# Patient Record
Sex: MALE | Race: WHITE | NOT HISPANIC OR LATINO | Employment: OTHER | URBAN - METROPOLITAN AREA
[De-identification: names, ages, dates, MRNs, and addresses within clinical notes are randomized per-mention and may not be internally consistent; named-entity substitution may affect disease eponyms.]

---

## 2022-04-06 ENCOUNTER — APPOINTMENT (OUTPATIENT)
Dept: RADIOLOGY | Facility: MEDICAL CENTER | Age: 72
End: 2022-04-06
Attending: EMERGENCY MEDICINE
Payer: COMMERCIAL

## 2022-04-06 ENCOUNTER — HOSPITAL ENCOUNTER (EMERGENCY)
Facility: MEDICAL CENTER | Age: 72
End: 2022-04-06
Attending: EMERGENCY MEDICINE
Payer: COMMERCIAL

## 2022-04-06 VITALS
TEMPERATURE: 97.8 F | HEART RATE: 77 BPM | WEIGHT: 200 LBS | SYSTOLIC BLOOD PRESSURE: 105 MMHG | DIASTOLIC BLOOD PRESSURE: 47 MMHG | RESPIRATION RATE: 18 BRPM | HEIGHT: 72 IN | BODY MASS INDEX: 27.09 KG/M2 | OXYGEN SATURATION: 98 %

## 2022-04-06 DIAGNOSIS — R55 SYNCOPE, UNSPECIFIED SYNCOPE TYPE: ICD-10-CM

## 2022-04-06 DIAGNOSIS — N18.9 CHRONIC KIDNEY DISEASE, UNSPECIFIED CKD STAGE: ICD-10-CM

## 2022-04-06 DIAGNOSIS — E86.0 DEHYDRATION: ICD-10-CM

## 2022-04-06 LAB
ALBUMIN SERPL BCP-MCNC: 3.4 G/DL (ref 3.2–4.9)
ALBUMIN/GLOB SERPL: 1 G/DL
ALP SERPL-CCNC: 85 U/L (ref 30–99)
ALT SERPL-CCNC: 16 U/L (ref 2–50)
ANION GAP SERPL CALC-SCNC: 10 MMOL/L (ref 7–16)
AST SERPL-CCNC: 15 U/L (ref 12–45)
BASOPHILS # BLD AUTO: 0.4 % (ref 0–1.8)
BASOPHILS # BLD: 0.05 K/UL (ref 0–0.12)
BILIRUB SERPL-MCNC: 0.4 MG/DL (ref 0.1–1.5)
BUN SERPL-MCNC: 22 MG/DL (ref 8–22)
CALCIUM SERPL-MCNC: 9.1 MG/DL (ref 8.4–10.2)
CHLORIDE SERPL-SCNC: 101 MMOL/L (ref 96–112)
CO2 SERPL-SCNC: 25 MMOL/L (ref 20–33)
CREAT SERPL-MCNC: 1.63 MG/DL (ref 0.5–1.4)
EKG IMPRESSION: NORMAL
EOSINOPHIL # BLD AUTO: 0.49 K/UL (ref 0–0.51)
EOSINOPHIL NFR BLD: 4 % (ref 0–6.9)
ERYTHROCYTE [DISTWIDTH] IN BLOOD BY AUTOMATED COUNT: 47.3 FL (ref 35.9–50)
GFR SERPLBLD CREATININE-BSD FMLA CKD-EPI: 44 ML/MIN/1.73 M 2
GLOBULIN SER CALC-MCNC: 3.4 G/DL (ref 1.9–3.5)
GLUCOSE SERPL-MCNC: 147 MG/DL (ref 65–99)
HCT VFR BLD AUTO: 36 % (ref 42–52)
HGB BLD-MCNC: 11.8 G/DL (ref 14–18)
IMM GRANULOCYTES # BLD AUTO: 0.07 K/UL (ref 0–0.11)
IMM GRANULOCYTES NFR BLD AUTO: 0.6 % (ref 0–0.9)
LACTATE BLD-SCNC: 1.7 MMOL/L (ref 0.5–2)
LACTATE BLD-SCNC: 2.2 MMOL/L (ref 0.5–2)
LYMPHOCYTES # BLD AUTO: 0.86 K/UL (ref 1–4.8)
LYMPHOCYTES NFR BLD: 7.1 % (ref 22–41)
MCH RBC QN AUTO: 31.9 PG (ref 27–33)
MCHC RBC AUTO-ENTMCNC: 32.8 G/DL (ref 33.7–35.3)
MCV RBC AUTO: 97.3 FL (ref 81.4–97.8)
MONOCYTES # BLD AUTO: 1.11 K/UL (ref 0–0.85)
MONOCYTES NFR BLD AUTO: 9.1 % (ref 0–13.4)
NEUTROPHILS # BLD AUTO: 9.61 K/UL (ref 1.82–7.42)
NEUTROPHILS NFR BLD: 78.8 % (ref 44–72)
NRBC # BLD AUTO: 0 K/UL
NRBC BLD-RTO: 0 /100 WBC
NT-PROBNP SERPL IA-MCNC: 168 PG/ML (ref 0–125)
PLATELET # BLD AUTO: 295 K/UL (ref 164–446)
PMV BLD AUTO: 10.4 FL (ref 9–12.9)
POTASSIUM SERPL-SCNC: 4.6 MMOL/L (ref 3.6–5.5)
PROT SERPL-MCNC: 6.8 G/DL (ref 6–8.2)
RBC # BLD AUTO: 3.7 M/UL (ref 4.7–6.1)
SODIUM SERPL-SCNC: 136 MMOL/L (ref 135–145)
TROPONIN T SERPL-MCNC: 32 NG/L (ref 6–19)
TROPONIN T SERPL-MCNC: 39 NG/L (ref 6–19)
WBC # BLD AUTO: 12.2 K/UL (ref 4.8–10.8)

## 2022-04-06 PROCEDURE — 85025 COMPLETE CBC W/AUTO DIFF WBC: CPT

## 2022-04-06 PROCEDURE — 93005 ELECTROCARDIOGRAM TRACING: CPT | Performed by: EMERGENCY MEDICINE

## 2022-04-06 PROCEDURE — 83880 ASSAY OF NATRIURETIC PEPTIDE: CPT

## 2022-04-06 PROCEDURE — 99285 EMERGENCY DEPT VISIT HI MDM: CPT

## 2022-04-06 PROCEDURE — 71045 X-RAY EXAM CHEST 1 VIEW: CPT

## 2022-04-06 PROCEDURE — 36415 COLL VENOUS BLD VENIPUNCTURE: CPT

## 2022-04-06 PROCEDURE — 84484 ASSAY OF TROPONIN QUANT: CPT

## 2022-04-06 PROCEDURE — 83605 ASSAY OF LACTIC ACID: CPT

## 2022-04-06 PROCEDURE — 700105 HCHG RX REV CODE 258: Performed by: EMERGENCY MEDICINE

## 2022-04-06 PROCEDURE — 80053 COMPREHEN METABOLIC PANEL: CPT

## 2022-04-06 RX ORDER — MONTELUKAST SODIUM 10 MG/1
10 TABLET ORAL EVERY EVENING
COMMUNITY

## 2022-04-06 RX ORDER — ALBUTEROL SULFATE 90 UG/1
1-2 AEROSOL, METERED RESPIRATORY (INHALATION) EVERY 4 HOURS PRN
COMMUNITY

## 2022-04-06 RX ORDER — SODIUM CHLORIDE 9 MG/ML
1000 INJECTION, SOLUTION INTRAVENOUS ONCE
Status: COMPLETED | OUTPATIENT
Start: 2022-04-06 | End: 2022-04-06

## 2022-04-06 RX ORDER — ACETAMINOPHEN 500 MG
500 TABLET ORAL EVERY 6 HOURS PRN
Status: SHIPPED | COMMUNITY
End: 2023-06-20

## 2022-04-06 RX ORDER — DEXAMETHASONE 4 MG/1
6 TABLET ORAL DAILY
Status: SHIPPED | COMMUNITY
End: 2023-06-20

## 2022-04-06 RX ORDER — FINASTERIDE 5 MG/1
5 TABLET, FILM COATED ORAL EVERY EVENING
COMMUNITY

## 2022-04-06 RX ORDER — BENZONATATE 100 MG/1
200 CAPSULE ORAL 3 TIMES DAILY PRN
COMMUNITY

## 2022-04-06 RX ORDER — GUAIFENESIN 200 MG/1
400 TABLET ORAL 3 TIMES DAILY
COMMUNITY

## 2022-04-06 RX ORDER — METFORMIN HYDROCHLORIDE 500 MG/1
2000 TABLET, EXTENDED RELEASE ORAL EVERY EVENING
Status: SHIPPED | COMMUNITY
End: 2023-06-20

## 2022-04-06 RX ORDER — LIDOCAINE 50 MG/G
1 PATCH TOPICAL 2 TIMES DAILY PRN
COMMUNITY

## 2022-04-06 RX ORDER — ALLOPURINOL 100 MG/1
200 TABLET ORAL EVERY EVENING
COMMUNITY

## 2022-04-06 RX ORDER — TAMSULOSIN HYDROCHLORIDE 0.4 MG/1
0.4 CAPSULE ORAL DAILY
COMMUNITY

## 2022-04-06 RX ORDER — NAPROXEN SODIUM 220 MG
220 TABLET ORAL 2 TIMES DAILY PRN
Status: SHIPPED | COMMUNITY
End: 2022-04-14

## 2022-04-06 RX ADMIN — SODIUM CHLORIDE 1000 ML: 9 INJECTION, SOLUTION INTRAVENOUS at 13:42

## 2022-04-06 ASSESSMENT — ENCOUNTER SYMPTOMS
EYE REDNESS: 0
FALLS: 1
CHILLS: 0
LOSS OF CONSCIOUSNESS: 1
DIZZINESS: 1
FOCAL WEAKNESS: 0
COUGH: 0
HEADACHES: 0
SHORTNESS OF BREATH: 0
BACK PAIN: 0
SEIZURES: 0
NECK PAIN: 0
FEVER: 0
ABDOMINAL PAIN: 0
VOMITING: 0
BLURRED VISION: 0
SORE THROAT: 0

## 2022-04-06 NOTE — ED NOTES
Pt AO4, sitting up in bed. Denies any pain or SOB at this time. Maintaining SpO2>90% on 4L NC.   Labs collected by straight stick and sent to lab.   Safety precautions in place including gurney locked in lowest position, both side rails up, call light within reach. Pt educated to use call light if requiring any assistance with getting oob.

## 2022-04-06 NOTE — ED NOTES
Med rec updated and complete, per pt   Allergies reviewed, per pt   Interviewed pt with wife at bedside with permission from pt.  Pt had a list of medications on his phone.

## 2022-04-06 NOTE — DISCHARGE INSTRUCTIONS
You were seen in the Emergency Department for syncope.    EKG, labs, chest xray were completed without significant acute abnormalities other than mild elevation of kidney function and dehydration.  X-ray shows continued signs of recent Covid infection.    Please use 1,000mg of tylenol every 6 hours as needed for pain.  Take home medication as directed.  Make sure you are drinking plenty of fluids    Please follow up with your primary care physician.    Return to the Emergency Department with recurrent syncope, chest pain, trouble breathing, or other concerns.

## 2022-04-06 NOTE — ED PROVIDER NOTES
"Patient time ED Provider Note    CHIEF COMPLAINT  Chief Complaint   Patient presents with   • Syncope     Pt walking out of restroom at [employer] station and \"I felt a little off and everything went black\" No LOC, did not hit head, no anticoags.        HPI  Juan José Pate is a 72 y.o. male with history of COPD, diabetes, hypertension with recent long hospitalization for Covid who presents to the emergency department following a syncopal episode.  The patient states he was feeling well this morning.  He went into a gas station bathroom and did sit down to have a bowel movement.  He got up washed his hands and was walking out of the bathroom with his walker when he started to feel dizzy and everything went black.  His wife was there and witnessed this.  The patient slowly lowered himself down and unclear if he fully lost consciousness.  He did not hit his head or injure himself.  Patient denies any associated headache, chest pain, increasing shortness of breath from his baseline, leg swelling.  He states he feels fine now.  The patient was on blood pressure medication however this was stopped while he was in the hospital with Covid as he did have some lower blood pressures.  He did have a protein shake this morning however wife does state that he does not drink a large amount of fluids.  He is currently on a baseline of 2 to 3 L of oxygen since his recent hospitalization.    REVIEW OF SYSTEMS  See HPI for further details.   Review of Systems   Constitutional: Negative for chills and fever.   HENT: Negative for sore throat.    Eyes: Negative for blurred vision and redness.   Respiratory: Negative for cough and shortness of breath.    Cardiovascular: Negative for chest pain and leg swelling.   Gastrointestinal: Negative for abdominal pain and vomiting.   Genitourinary: Negative for dysuria and urgency.   Musculoskeletal: Positive for falls. Negative for back pain and neck pain.   Skin: Negative for rash.   Neurological: " Positive for dizziness and loss of consciousness. Negative for focal weakness, seizures and headaches.   Psychiatric/Behavioral: Negative for suicidal ideas.         PAST MEDICAL HISTORY   has a past medical history of Asthma, Chronic obstructive pulmonary disease (HCC), Diabetes (HCC), and Hypertension.    SOCIAL HISTORY  Social History     Tobacco Use   • Smoking status: Former Smoker     Years: 6.00     Quit date:      Years since quittin.2   • Smokeless tobacco: Never Used   Vaping Use   • Vaping Use: Never used   Substance and Sexual Activity   • Alcohol use: Never   • Drug use: Never   • Sexual activity: Not on file       SURGICAL HISTORY  patient denies any surgical history    CURRENT MEDICATIONS  Home Medications     Reviewed by Leia Martinez (Pharmacy Tech) on 22 at 1251  Med List Status: Complete   Medication Last Dose Status   acetaminophen (TYLENOL) 500 MG Tab 2022 Active   albuterol 108 (90 Base) MCG/ACT Aero Soln inhalation aerosol 2022 Active   allopurinol (ZYLOPRIM) 100 MG Tab 2022 Active   benzonatate (TESSALON) 100 MG Cap 2022 Active   dexamethasone (DECADRON) 4 MG Tab 4/3/2022 Active   diclofenac sodium (VOLTAREN) 1 % Gel > 2 days Active   finasteride (PROSCAR) 5 MG Tab 2022 Active   fluticasone-salmeterol (ADVAIR) 100-50 MCG/DOSE AEROSOL POWDER, BREATH ACTIVATED 2022 Active   guaifenesin 200 MG tablet 2022 Active   ipratropium (ATROVENT) 0.02 % Solution 2022 Active   lidocaine (LIDODERM) 5 % Patch 2022 Active   metFORMIN ER (GLUCOPHAGE XR) 500 MG TABLET SR 24 HR 2022 Active   montelukast (SINGULAIR) 10 MG Tab 2022 Active   naproxen (ALEVE) 220 MG tablet 2022 Active   tamsulosin (FLOMAX) 0.4 MG capsule 2022 Active                ALLERGIES  Allergies   Allergen Reactions   • Oxycodone Vomiting and Nausea   • Sulfa Drugs Rash     .       PHYSICAL EXAM   VITAL SIGNS: /47   Pulse 77   Temp 36.6 °C (97.8 °F) (Temporal)    Resp 18   Ht 1.829 m (6')   Wt 90.7 kg (200 lb)   SpO2 98%   BMI 27.12 kg/m²      Physical Exam  Constitutional:       General: He is not in acute distress.     Comments: Chronically ill, nontoxic appearing   HENT:      Head: Normocephalic and atraumatic.   Eyes:      Conjunctiva/sclera: Conjunctivae normal.      Pupils: Pupils are equal, round, and reactive to light.   Cardiovascular:      Rate and Rhythm: Normal rate and regular rhythm.      Heart sounds: Normal heart sounds.   Pulmonary:      Effort: Pulmonary effort is normal. No respiratory distress.      Breath sounds: Normal breath sounds.   Abdominal:      General: There is no distension.      Palpations: Abdomen is soft.      Tenderness: There is no abdominal tenderness.   Musculoskeletal:         General: No tenderness. Normal range of motion.      Cervical back: Normal range of motion and neck supple.   Skin:     General: Skin is warm and dry.   Neurological:      Mental Status: He is alert and oriented to person, place, and time.      Comments: Moving all extremities spontaneously   Psychiatric:         Mood and Affect: Affect normal.           DIAGNOSTIC STUDIES    EKG  Results for orders placed or performed during the hospital encounter of 22   EKG (NOW)   Result Value Ref Range    Report       [facility] Emergency [facility].    Test Date:  2022  Pt Name:    PHYLICIA DOTY                 Department: [facility]  MRN:        [id number]                      Room:       -ROOM 9  Gender:     Male                         Technician: [id number]  :        [id number]                   Requested By:MISSY WALKER  Order #:    [id number]                    Reading MD: Missy Walker MD    Measurements  Intervals                                Axis  Rate:       87                           P:          23  TX:         174                          QRS:        -8  QRSD:       84                           T:          6  QT:          371  QTc:        447    Interpretive Statements  Sinus rhythm  Ventricular trigeminy  Probable left atrial enlargement  Left ventricular hypertrophy  No ST or T wave change  No previous ECG available for comparison  Electronically Signed On 4-6-2022 14:45:55 PDT by Missy Holt MD             LABS  Personally reviewed by me  Labs Reviewed   CBC WITH DIFFERENTIAL - Abnormal; Notable for the following components:       Result Value    WBC 12.2 (*)     RBC 3.70 (*)     Hemoglobin 11.8 (*)     Hematocrit 36.0 (*)     MCHC 32.8 (*)     Neutrophils-Polys 78.80 (*)     Lymphocytes 7.10 (*)     Neutrophils (Absolute) 9.61 (*)     Lymphs (Absolute) 0.86 (*)     Monos (Absolute) 1.11 (*)     All other components within normal limits   COMP METABOLIC PANEL - Abnormal; Notable for the following components:    Glucose 147 (*)     Creatinine 1.63 (*)     All other components within normal limits   TROPONIN - Abnormal; Notable for the following components:    Troponin T 32 (*)     All other components within normal limits   LACTIC ACID - Abnormal; Notable for the following components:    Lactic Acid 2.2 (*)     All other components within normal limits   PROBRAIN NATRIURETIC PEPTIDE, NT - Abnormal; Notable for the following components:    NT-proBNP 168 (*)     All other components within normal limits   ESTIMATED GFR - Abnormal; Notable for the following components:    GFR (CKD-EPI) 44 (*)     All other components within normal limits   TROPONIN - Abnormal; Notable for the following components:    Troponin T 39 (*)     All other components within normal limits   LACTIC ACID           RADIOLOGY  Personally reviewed by me  DX-CHEST-PORTABLE (1 VIEW)   Final Result      1.  Use bilateral patchy ill-defined airspace and interstitial infiltrates, right greater than left. Differential diagnosis includes atypical infection such as Covid pneumonia, chronic interstitial lung disease and atypical interstitial edema.      2.  Elevation of  the right hemidiaphragm.                ED COURSE  Vitals:    04/06/22 1546 04/06/22 1615 04/06/22 1645 04/06/22 1647   BP:  106/68 105/47    Pulse:  88 77    Resp:       Temp:       TempSrc:       SpO2: 97% 95%  98%   Weight:       Height:             Medications administered:  Medications   NS infusion 1,000 mL (0 mL Intravenous Stopped 4/6/22 1432)         Old records personally reviewed:  Unable to review [facility] or prior labs.        MEDICAL DECISION MAKING  Patient with history HTN, diabetes, COPD and recent prolonged admission for Covid who presents following a syncopal episode today.  His blood pressure was slightly low on arrival however patient has otherwise normal vital signs.  He has no hypoxia on his baseline oxygen requirement or signs of respiratory distress.  Patient does not have any symptoms concerning for ACS, pulmonary embolism, or subarachnoid hemorrhage.  His EKG does not show ischemia or arrhythmia.  His frequent PVCs however.  Labs show mild elevation of lactate and mild nonspecific leukocytosis.  He has no infectious symptoms therefore I think sepsis is unlikely.  Chest x-ray does show persistent findings from recent Covid infection, doubt new pneumonia. He does have a history of poor oral intake and may be mildly dehydrated.  His creatinine is elevated at 1.6.  The patient reports he does have a history of some underlying kidney disease although he does not know his baseline creatinine.  Troponin is minimally elevated however patient is not having any chest pain and is not significantly increasing on repeat therefore ACS unlikely.    Upon reassessment, patient is resting comfortably with normal vital signs.  No new complaints at this time. He states he feels well and he was able to ambulate throughout the department without dizziness.  I suspect that this was likely due to episode of low blood pressure/vagal response plus some mild dehydration.  Discussed results with patient  and/or family as well as importance of primary care follow up for recheck of kidney function in the next week.  Patient understands plan of care and strict return precautions for new or changing symptoms.         IMPRESSION  (R55) Syncope, unspecified syncope type  (N18.9) Chronic kidney disease, unspecified CKD stage  (E86.0) Dehydration    Disposition: Discharge home, stable condition  Results, diagnoses, and treatment options were discussed with the patient and/or family. Patient verbalized understanding of plan of care.    Patient referred to primary care provider for monitoring and treatment of blood pressure.      Discharge Medication List as of 4/6/2022  4:49 PM              Electronically signed by: Missy Holt M.D., 4/6/2022 1:06 PM

## 2022-04-06 NOTE — ED NOTES
"NS bolus completed. BP improved, SR w/ PVCs on monitor.   Pt reports \"I feel good, just tired\" denies any pain or SOB at this time.  "

## 2022-04-06 NOTE — ED TRIAGE NOTES
"Chief Complaint   Patient presents with   • Syncope     Pt walking out of restroom at gas station and \"I felt a little off and everything went black\" No LOC, did not hit head, no anticoags.      BP (!) 95/64   Pulse 95   Temp 36.4 °C (97.5 °F) (Temporal)   Resp (!) 28   Ht 1.829 m (6')   Wt 90.7 kg (200 lb)   SpO2 98%   BMI 27.12 kg/m²     Pt bib ems, PIV established. Per EMS: GCS 15, . Pt wears 2-4 L home oxygen and sating mid 80s, placed on 6L NC. 12 lead= sinus tach with PVCs.   [facility] day [facility] for covid pna.   "

## 2022-04-14 ENCOUNTER — HOSPITAL ENCOUNTER (INPATIENT)
Facility: MEDICAL CENTER | Age: 72
LOS: 6 days | DRG: 871 | End: 2022-04-20
Attending: EMERGENCY MEDICINE | Admitting: HOSPITALIST
Payer: COMMERCIAL

## 2022-04-14 ENCOUNTER — APPOINTMENT (OUTPATIENT)
Dept: RADIOLOGY | Facility: MEDICAL CENTER | Age: 72
DRG: 871 | End: 2022-04-14
Attending: EMERGENCY MEDICINE
Payer: COMMERCIAL

## 2022-04-14 DIAGNOSIS — R91.8 PULMONARY INFILTRATES: ICD-10-CM

## 2022-04-14 DIAGNOSIS — R11.0 NAUSEA: ICD-10-CM

## 2022-04-14 DIAGNOSIS — R09.02 HYPOXIA: ICD-10-CM

## 2022-04-14 DIAGNOSIS — A41.9 SEPSIS WITHOUT ACUTE ORGAN DYSFUNCTION, DUE TO UNSPECIFIED ORGANISM (HCC): ICD-10-CM

## 2022-04-14 DIAGNOSIS — Z79.899 DVT PROPHYLAXIS: ICD-10-CM

## 2022-04-14 DIAGNOSIS — U07.1 COVID-19: ICD-10-CM

## 2022-04-14 DIAGNOSIS — J12.82 PNEUMONIA DUE TO COVID-19 VIRUS: ICD-10-CM

## 2022-04-14 DIAGNOSIS — K59.00 CONSTIPATION, UNSPECIFIED CONSTIPATION TYPE: ICD-10-CM

## 2022-04-14 DIAGNOSIS — U07.1 PNEUMONIA DUE TO COVID-19 VIRUS: ICD-10-CM

## 2022-04-14 DIAGNOSIS — R73.9 HYPERGLYCEMIA: ICD-10-CM

## 2022-04-14 DIAGNOSIS — R55 SYNCOPE, UNSPECIFIED SYNCOPE TYPE: ICD-10-CM

## 2022-04-14 DIAGNOSIS — R55 SYNCOPE AND COLLAPSE: ICD-10-CM

## 2022-04-14 PROBLEM — N18.31 STAGE 3A CHRONIC KIDNEY DISEASE: Status: ACTIVE | Noted: 2022-04-14

## 2022-04-14 PROBLEM — K21.9 GASTROESOPHAGEAL REFLUX DISEASE WITHOUT ESOPHAGITIS: Status: ACTIVE | Noted: 2022-04-14

## 2022-04-14 PROBLEM — R19.7 DIARRHEA: Status: ACTIVE | Noted: 2022-04-14

## 2022-04-14 PROBLEM — J18.9 PNEUMONIA: Status: ACTIVE | Noted: 2022-04-14

## 2022-04-14 PROBLEM — J96.21 ACUTE ON CHRONIC RESPIRATORY FAILURE WITH HYPOXIA (HCC): Status: ACTIVE | Noted: 2022-04-14

## 2022-04-14 LAB
ALBUMIN SERPL BCP-MCNC: 3.6 G/DL (ref 3.2–4.9)
ALBUMIN/GLOB SERPL: 1 G/DL
ALP SERPL-CCNC: 95 U/L (ref 30–99)
ALT SERPL-CCNC: 22 U/L (ref 2–50)
ANION GAP SERPL CALC-SCNC: 10 MMOL/L (ref 7–16)
APPEARANCE UR: CLEAR
AST SERPL-CCNC: 19 U/L (ref 12–45)
BASOPHILS # BLD AUTO: 0.6 % (ref 0–1.8)
BASOPHILS # BLD: 0.06 K/UL (ref 0–0.12)
BILIRUB SERPL-MCNC: 0.4 MG/DL (ref 0.1–1.5)
BILIRUB UR QL STRIP.AUTO: NEGATIVE
BUN SERPL-MCNC: 21 MG/DL (ref 8–22)
CALCIUM SERPL-MCNC: 9.4 MG/DL (ref 8.4–10.2)
CHLORIDE SERPL-SCNC: 102 MMOL/L (ref 96–112)
CO2 SERPL-SCNC: 26 MMOL/L (ref 20–33)
COLOR UR: YELLOW
CREAT SERPL-MCNC: 1.32 MG/DL (ref 0.5–1.4)
EKG IMPRESSION: NORMAL
EOSINOPHIL # BLD AUTO: 0.36 K/UL (ref 0–0.51)
EOSINOPHIL NFR BLD: 3.6 % (ref 0–6.9)
ERYTHROCYTE [DISTWIDTH] IN BLOOD BY AUTOMATED COUNT: 47.8 FL (ref 35.9–50)
FLUAV RNA SPEC QL NAA+PROBE: NEGATIVE
FLUBV RNA SPEC QL NAA+PROBE: NEGATIVE
GFR SERPLBLD CREATININE-BSD FMLA CKD-EPI: 57 ML/MIN/1.73 M 2
GLOBULIN SER CALC-MCNC: 3.7 G/DL (ref 1.9–3.5)
GLUCOSE BLD STRIP.AUTO-MCNC: 140 MG/DL (ref 65–99)
GLUCOSE SERPL-MCNC: 128 MG/DL (ref 65–99)
GLUCOSE UR STRIP.AUTO-MCNC: 500 MG/DL
HCT VFR BLD AUTO: 41.7 % (ref 42–52)
HGB BLD-MCNC: 13.6 G/DL (ref 14–18)
IMM GRANULOCYTES # BLD AUTO: 0.07 K/UL (ref 0–0.11)
IMM GRANULOCYTES NFR BLD AUTO: 0.7 % (ref 0–0.9)
KETONES UR STRIP.AUTO-MCNC: NEGATIVE MG/DL
LACTATE BLD-SCNC: 1.2 MMOL/L (ref 0.5–2)
LEUKOCYTE ESTERASE UR QL STRIP.AUTO: NEGATIVE
LYMPHOCYTES # BLD AUTO: 0.73 K/UL (ref 1–4.8)
LYMPHOCYTES NFR BLD: 7.3 % (ref 22–41)
MCH RBC QN AUTO: 31.6 PG (ref 27–33)
MCHC RBC AUTO-ENTMCNC: 32.6 G/DL (ref 33.7–35.3)
MCV RBC AUTO: 97 FL (ref 81.4–97.8)
MICRO URNS: ABNORMAL
MONOCYTES # BLD AUTO: 0.7 K/UL (ref 0–0.85)
MONOCYTES NFR BLD AUTO: 7 % (ref 0–13.4)
NEUTROPHILS # BLD AUTO: 8.02 K/UL (ref 1.82–7.42)
NEUTROPHILS NFR BLD: 80.8 % (ref 44–72)
NITRITE UR QL STRIP.AUTO: NEGATIVE
NRBC # BLD AUTO: 0 K/UL
NRBC BLD-RTO: 0 /100 WBC
NT-PROBNP SERPL IA-MCNC: 109 PG/ML (ref 0–125)
PH UR STRIP.AUTO: 5.5 [PH] (ref 5–8)
PLATELET # BLD AUTO: 206 K/UL (ref 164–446)
PMV BLD AUTO: 10 FL (ref 9–12.9)
POTASSIUM SERPL-SCNC: 4.7 MMOL/L (ref 3.6–5.5)
PROCALCITONIN SERPL-MCNC: 0.14 NG/ML
PROT SERPL-MCNC: 7.3 G/DL (ref 6–8.2)
PROT UR QL STRIP: NEGATIVE MG/DL
RBC # BLD AUTO: 4.3 M/UL (ref 4.7–6.1)
RBC UR QL AUTO: NEGATIVE
RSV RNA SPEC QL NAA+PROBE: NEGATIVE
SARS-COV-2 RNA RESP QL NAA+PROBE: DETECTED
SODIUM SERPL-SCNC: 138 MMOL/L (ref 135–145)
SP GR UR STRIP.AUTO: 1.02
SPECIMEN SOURCE: ABNORMAL
TROPONIN T SERPL-MCNC: 16 NG/L (ref 6–19)
WBC # BLD AUTO: 9.9 K/UL (ref 4.8–10.8)

## 2022-04-14 PROCEDURE — 700102 HCHG RX REV CODE 250 W/ 637 OVERRIDE(OP): Performed by: EMERGENCY MEDICINE

## 2022-04-14 PROCEDURE — 71045 X-RAY EXAM CHEST 1 VIEW: CPT

## 2022-04-14 PROCEDURE — 71275 CT ANGIOGRAPHY CHEST: CPT

## 2022-04-14 PROCEDURE — 83880 ASSAY OF NATRIURETIC PEPTIDE: CPT

## 2022-04-14 PROCEDURE — 0241U HCHG SARS-COV-2 COVID-19 NFCT DS RESP RNA 4 TRGT MIC: CPT

## 2022-04-14 PROCEDURE — 99285 EMERGENCY DEPT VISIT HI MDM: CPT

## 2022-04-14 PROCEDURE — 83605 ASSAY OF LACTIC ACID: CPT

## 2022-04-14 PROCEDURE — 81003 URINALYSIS AUTO W/O SCOPE: CPT

## 2022-04-14 PROCEDURE — 700102 HCHG RX REV CODE 250 W/ 637 OVERRIDE(OP): Performed by: HOSPITALIST

## 2022-04-14 PROCEDURE — 700117 HCHG RX CONTRAST REV CODE 255: Performed by: EMERGENCY MEDICINE

## 2022-04-14 PROCEDURE — 84484 ASSAY OF TROPONIN QUANT: CPT

## 2022-04-14 PROCEDURE — 87086 URINE CULTURE/COLONY COUNT: CPT

## 2022-04-14 PROCEDURE — 87040 BLOOD CULTURE FOR BACTERIA: CPT

## 2022-04-14 PROCEDURE — 770020 HCHG ROOM/CARE - TELE (206)

## 2022-04-14 PROCEDURE — 36415 COLL VENOUS BLD VENIPUNCTURE: CPT

## 2022-04-14 PROCEDURE — 94640 AIRWAY INHALATION TREATMENT: CPT

## 2022-04-14 PROCEDURE — 93005 ELECTROCARDIOGRAM TRACING: CPT | Performed by: EMERGENCY MEDICINE

## 2022-04-14 PROCEDURE — 84145 PROCALCITONIN (PCT): CPT

## 2022-04-14 PROCEDURE — 80053 COMPREHEN METABOLIC PANEL: CPT

## 2022-04-14 PROCEDURE — 99223 1ST HOSP IP/OBS HIGH 75: CPT | Mod: AI | Performed by: HOSPITALIST

## 2022-04-14 PROCEDURE — C9803 HOPD COVID-19 SPEC COLLECT: HCPCS | Performed by: EMERGENCY MEDICINE

## 2022-04-14 PROCEDURE — 87641 MR-STAPH DNA AMP PROBE: CPT

## 2022-04-14 PROCEDURE — 85025 COMPLETE CBC W/AUTO DIFF WBC: CPT

## 2022-04-14 PROCEDURE — 82962 GLUCOSE BLOOD TEST: CPT

## 2022-04-14 PROCEDURE — A9270 NON-COVERED ITEM OR SERVICE: HCPCS | Performed by: HOSPITALIST

## 2022-04-14 PROCEDURE — A9270 NON-COVERED ITEM OR SERVICE: HCPCS | Performed by: EMERGENCY MEDICINE

## 2022-04-14 RX ORDER — AMOXICILLIN AND CLAVULANATE POTASSIUM 875; 125 MG/1; MG/1
1 TABLET, FILM COATED ORAL 2 TIMES DAILY
Status: ON HOLD | COMMUNITY
Start: 2022-04-13 | End: 2022-04-19

## 2022-04-14 RX ORDER — AMOXICILLIN 250 MG
2 CAPSULE ORAL 2 TIMES DAILY
Status: DISCONTINUED | OUTPATIENT
Start: 2022-04-14 | End: 2022-04-14

## 2022-04-14 RX ORDER — PANTOPRAZOLE SODIUM 40 MG/1
40 TABLET, DELAYED RELEASE ORAL DAILY
COMMUNITY

## 2022-04-14 RX ORDER — SODIUM CHLORIDE, SODIUM LACTATE, POTASSIUM CHLORIDE, AND CALCIUM CHLORIDE .6; .31; .03; .02 G/100ML; G/100ML; G/100ML; G/100ML
500 INJECTION, SOLUTION INTRAVENOUS
Status: DISCONTINUED | OUTPATIENT
Start: 2022-04-14 | End: 2022-04-20 | Stop reason: HOSPADM

## 2022-04-14 RX ORDER — TRAMADOL HYDROCHLORIDE 50 MG/1
50 TABLET ORAL EVERY 6 HOURS PRN
Status: DISCONTINUED | OUTPATIENT
Start: 2022-04-14 | End: 2022-04-20 | Stop reason: HOSPADM

## 2022-04-14 RX ORDER — BUPROPION HYDROCHLORIDE 150 MG/1
150 TABLET, EXTENDED RELEASE ORAL 2 TIMES DAILY
Status: DISCONTINUED | OUTPATIENT
Start: 2022-04-14 | End: 2022-04-20 | Stop reason: HOSPADM

## 2022-04-14 RX ORDER — SODIUM CHLORIDE, SODIUM LACTATE, POTASSIUM CHLORIDE, AND CALCIUM CHLORIDE .6; .31; .03; .02 G/100ML; G/100ML; G/100ML; G/100ML
30 INJECTION, SOLUTION INTRAVENOUS
Status: DISCONTINUED | OUTPATIENT
Start: 2022-04-14 | End: 2022-04-20 | Stop reason: HOSPADM

## 2022-04-14 RX ORDER — MONTELUKAST SODIUM 10 MG/1
10 TABLET ORAL EVERY EVENING
Status: DISCONTINUED | OUTPATIENT
Start: 2022-04-14 | End: 2022-04-20 | Stop reason: HOSPADM

## 2022-04-14 RX ORDER — HYDROMORPHONE HYDROCHLORIDE 1 MG/ML
0.25 INJECTION, SOLUTION INTRAMUSCULAR; INTRAVENOUS; SUBCUTANEOUS
Status: DISCONTINUED | OUTPATIENT
Start: 2022-04-14 | End: 2022-04-14

## 2022-04-14 RX ORDER — ONDANSETRON 4 MG/1
4 TABLET, ORALLY DISINTEGRATING ORAL EVERY 4 HOURS PRN
Status: DISCONTINUED | OUTPATIENT
Start: 2022-04-14 | End: 2022-04-20 | Stop reason: HOSPADM

## 2022-04-14 RX ORDER — HYDROXYZINE HYDROCHLORIDE 25 MG/1
25 TABLET, FILM COATED ORAL
COMMUNITY
End: 2023-06-20

## 2022-04-14 RX ORDER — FINASTERIDE 5 MG/1
5 TABLET, FILM COATED ORAL EVERY EVENING
Status: DISCONTINUED | OUTPATIENT
Start: 2022-04-14 | End: 2022-04-20 | Stop reason: HOSPADM

## 2022-04-14 RX ORDER — ALBUTEROL SULFATE 90 UG/1
1-2 AEROSOL, METERED RESPIRATORY (INHALATION) EVERY 4 HOURS PRN
Status: DISCONTINUED | OUTPATIENT
Start: 2022-04-14 | End: 2022-04-20 | Stop reason: HOSPADM

## 2022-04-14 RX ORDER — EMPAGLIFLOZIN 25 MG/1
12.5 TABLET, FILM COATED ORAL DAILY
COMMUNITY

## 2022-04-14 RX ORDER — BUPROPION HYDROCHLORIDE 150 MG/1
150 TABLET, EXTENDED RELEASE ORAL 2 TIMES DAILY
COMMUNITY

## 2022-04-14 RX ORDER — ALLOPURINOL 100 MG/1
200 TABLET ORAL EVERY EVENING
Status: DISCONTINUED | OUTPATIENT
Start: 2022-04-14 | End: 2022-04-20 | Stop reason: HOSPADM

## 2022-04-14 RX ORDER — OXYCODONE HYDROCHLORIDE 5 MG/1
2.5 TABLET ORAL
Status: DISCONTINUED | OUTPATIENT
Start: 2022-04-14 | End: 2022-04-14

## 2022-04-14 RX ORDER — LINEZOLID 2 MG/ML
600 INJECTION, SOLUTION INTRAVENOUS EVERY 12 HOURS
Status: DISCONTINUED | OUTPATIENT
Start: 2022-04-14 | End: 2022-04-14

## 2022-04-14 RX ORDER — HYDROXYZINE HYDROCHLORIDE 25 MG/1
25 TABLET, FILM COATED ORAL
Status: DISCONTINUED | OUTPATIENT
Start: 2022-04-14 | End: 2022-04-20 | Stop reason: HOSPADM

## 2022-04-14 RX ORDER — ACETAMINOPHEN 500 MG
500 TABLET ORAL EVERY 6 HOURS PRN
Status: DISCONTINUED | OUTPATIENT
Start: 2022-04-14 | End: 2022-04-17

## 2022-04-14 RX ORDER — DEXAMETHASONE 4 MG/1
6 TABLET ORAL DAILY
Status: DISCONTINUED | OUTPATIENT
Start: 2022-04-15 | End: 2022-04-20 | Stop reason: HOSPADM

## 2022-04-14 RX ORDER — DEXAMETHASONE 4 MG/1
6 TABLET ORAL ONCE
Status: COMPLETED | OUTPATIENT
Start: 2022-04-14 | End: 2022-04-14

## 2022-04-14 RX ORDER — TAMSULOSIN HYDROCHLORIDE 0.4 MG/1
0.4 CAPSULE ORAL EVERY EVENING
Status: DISCONTINUED | OUTPATIENT
Start: 2022-04-14 | End: 2022-04-18

## 2022-04-14 RX ORDER — BISACODYL 10 MG
10 SUPPOSITORY, RECTAL RECTAL
Status: DISCONTINUED | OUTPATIENT
Start: 2022-04-14 | End: 2022-04-14

## 2022-04-14 RX ORDER — ONDANSETRON 2 MG/ML
4 INJECTION INTRAMUSCULAR; INTRAVENOUS EVERY 4 HOURS PRN
Status: DISCONTINUED | OUTPATIENT
Start: 2022-04-14 | End: 2022-04-20 | Stop reason: HOSPADM

## 2022-04-14 RX ORDER — OXYCODONE HYDROCHLORIDE 5 MG/1
5 TABLET ORAL
Status: DISCONTINUED | OUTPATIENT
Start: 2022-04-14 | End: 2022-04-14

## 2022-04-14 RX ORDER — BENZONATATE 100 MG/1
200 CAPSULE ORAL 3 TIMES DAILY PRN
Status: DISCONTINUED | OUTPATIENT
Start: 2022-04-14 | End: 2022-04-20 | Stop reason: HOSPADM

## 2022-04-14 RX ORDER — OMEPRAZOLE 20 MG/1
20 CAPSULE, DELAYED RELEASE ORAL DAILY
Status: DISCONTINUED | OUTPATIENT
Start: 2022-04-15 | End: 2022-04-20 | Stop reason: HOSPADM

## 2022-04-14 RX ORDER — POLYETHYLENE GLYCOL 3350 17 G/17G
1 POWDER, FOR SOLUTION ORAL
Status: DISCONTINUED | OUTPATIENT
Start: 2022-04-14 | End: 2022-04-14

## 2022-04-14 RX ORDER — BUDESONIDE AND FORMOTEROL FUMARATE DIHYDRATE 80; 4.5 UG/1; UG/1
2 AEROSOL RESPIRATORY (INHALATION)
Status: DISCONTINUED | OUTPATIENT
Start: 2022-04-14 | End: 2022-04-20 | Stop reason: HOSPADM

## 2022-04-14 RX ADMIN — RIVAROXABAN 10 MG: 10 TABLET, FILM COATED ORAL at 20:32

## 2022-04-14 RX ADMIN — TAMSULOSIN HYDROCHLORIDE 0.4 MG: 0.4 CAPSULE ORAL at 19:54

## 2022-04-14 RX ADMIN — ALLOPURINOL 200 MG: 100 TABLET ORAL at 19:53

## 2022-04-14 RX ADMIN — MONTELUKAST 10 MG: 10 TABLET, FILM COATED ORAL at 19:53

## 2022-04-14 RX ADMIN — TRAMADOL HYDROCHLORIDE 50 MG: 50 TABLET, COATED ORAL at 20:59

## 2022-04-14 RX ADMIN — BENZONATATE 200 MG: 100 CAPSULE ORAL at 20:33

## 2022-04-14 RX ADMIN — BUPROPION HYDROCHLORIDE 150 MG: 150 TABLET, EXTENDED RELEASE ORAL at 19:54

## 2022-04-14 RX ADMIN — DEXAMETHASONE 6 MG: 4 TABLET ORAL at 17:01

## 2022-04-14 RX ADMIN — IOHEXOL 75 ML: 350 INJECTION, SOLUTION INTRAVENOUS at 15:45

## 2022-04-14 RX ADMIN — BUDESONIDE AND FORMOTEROL FUMARATE DIHYDRATE 2 PUFF: 80; 4.5 AEROSOL RESPIRATORY (INHALATION) at 20:57

## 2022-04-14 RX ADMIN — FINASTERIDE 5 MG: 5 TABLET, FILM COATED ORAL at 19:54

## 2022-04-14 ASSESSMENT — COGNITIVE AND FUNCTIONAL STATUS - GENERAL
SUGGESTED CMS G CODE MODIFIER DAILY ACTIVITY: CJ
DRESSING REGULAR LOWER BODY CLOTHING: A LITTLE
WALKING IN HOSPITAL ROOM: A LITTLE
MOVING FROM LYING ON BACK TO SITTING ON SIDE OF FLAT BED: A LITTLE
SUGGESTED CMS G CODE MODIFIER MOBILITY: CK
MOBILITY SCORE: 19
STANDING UP FROM CHAIR USING ARMS: A LITTLE
DAILY ACTIVITIY SCORE: 22
CLIMB 3 TO 5 STEPS WITH RAILING: A LITTLE
MOVING TO AND FROM BED TO CHAIR: A LITTLE
DRESSING REGULAR UPPER BODY CLOTHING: A LITTLE

## 2022-04-14 ASSESSMENT — ENCOUNTER SYMPTOMS
SHORTNESS OF BREATH: 1
CHILLS: 1
EYE REDNESS: 0
MYALGIAS: 0
EYE DISCHARGE: 0
FEVER: 0
ABDOMINAL PAIN: 0
STRIDOR: 0
NERVOUS/ANXIOUS: 0
COUGH: 1
BRUISES/BLEEDS EASILY: 0
FOCAL WEAKNESS: 0
VOMITING: 0
FLANK PAIN: 0

## 2022-04-14 ASSESSMENT — PATIENT HEALTH QUESTIONNAIRE - PHQ9
SUM OF ALL RESPONSES TO PHQ9 QUESTIONS 1 AND 2: 0
2. FEELING DOWN, DEPRESSED, IRRITABLE, OR HOPELESS: NOT AT ALL
1. LITTLE INTEREST OR PLEASURE IN DOING THINGS: NOT AT ALL

## 2022-04-14 ASSESSMENT — FIBROSIS 4 INDEX: FIB4 SCORE: 0.93

## 2022-04-14 ASSESSMENT — LIFESTYLE VARIABLES
EVER HAD A DRINK FIRST THING IN THE MORNING TO STEADY YOUR NERVES TO GET RID OF A HANGOVER: NO
ON A TYPICAL DAY WHEN YOU DRINK ALCOHOL HOW MANY DRINKS DO YOU HAVE: 0
HOW MANY TIMES IN THE PAST YEAR HAVE YOU HAD 5 OR MORE DRINKS IN A DAY: 0
EVER FELT BAD OR GUILTY ABOUT YOUR DRINKING: NO
HAVE PEOPLE ANNOYED YOU BY CRITICIZING YOUR DRINKING: NO
HAVE YOU EVER FELT YOU SHOULD CUT DOWN ON YOUR DRINKING: NO
TOTAL SCORE: 0
AVERAGE NUMBER OF DAYS PER WEEK YOU HAVE A DRINK CONTAINING ALCOHOL: 0
TOTAL SCORE: 0
ALCOHOL_USE: NO
CONSUMPTION TOTAL: NEGATIVE
TOTAL SCORE: 0

## 2022-04-14 ASSESSMENT — PAIN DESCRIPTION - PAIN TYPE
TYPE: ACUTE PAIN
TYPE: ACUTE PAIN

## 2022-04-14 NOTE — ED PROVIDER NOTES
"ED Provider Note  CHIEF COMPLAINT  Chief Complaint   Patient presents with   • Shortness of Breath   • Cough   • Diarrhea   • Dizziness     Diarrhea a few days ago,  SOB increased since last night on home oxygen at 6-8 liters increased demands today  Ambulating with home health RN today and had a syncopal episode  + Covid March 9th admitted until March 30th   Diagnosed with pneumonia last week        HPI  Juan José Pate is a 72 y.o. male who presents to the ER by ambulance with a syncopal episode.  [facility].  He presented there on Saturday with chills.  He says he felt pretty well upon discharge.  He was diagnosed with COVID-19 on March 9.  He was vaccinated with J&J but did not get a booster.  He was here in the hospital until March 30.  He was discharged home on 4 L of oxygen.  Over the last week or so he has been using 8 L of oxygen with exertion and 4 L of oxygen at rest.  He has had to increase his oxygen requirement.  He had a syncopal episode on April 6 and was seen here in the ER at [facility] at that time.  He was discharged home.  [facility] as noted [facility].  Patient has not been coughing up any blood.  He is coughing up some yellow-green phlegm.  No pain or swelling in his legs.  No history of DVT or PE.  His syncopal episode today occurred as he was walking towards the door to meet his home health nurse.  He says he was talking to his home health nurse and he \"went gray\".  He passed out very briefly.  He said that his home health workers guided him to the floor.  He did not injure himself other than in the right lower ribs.  He says he believes he struck his right lower ribs on the metal handlebar of his walker as he went down.  No abdominal pain.  No nausea or vomiting.  Patient presents on a nonrebreather mask saturating in the mid 90s.  He said his O2 sat was 82% on his 8 L " last night.    REVIEW OF SYSTEMS  See HPI for further details. All other systems are negative.    PAST MEDICAL HISTORY  Past Medical History:   Diagnosis Date   • Asthma    • Chronic obstructive pulmonary disease (HCC)    • COVID    • Diabetes (HCC)    • Hypertension        FAMILY HISTORY  Family History   Problem Relation Age of Onset   • Hypertension Father        SOCIAL HISTORY  Social History     Socioeconomic History   • Marital status:    Tobacco Use   • Smoking status: Former Smoker     Years: 6.00     Quit date:      Years since quittin.3   • Smokeless tobacco: Never Used   Vaping Use   • Vaping Use: Never used   Substance and Sexual Activity   • Alcohol use: Never   • Drug use: Never       SURGICAL HISTORY  History reviewed. No pertinent surgical history.    CURRENT MEDICATIONS  Home Medications     Reviewed by Leia Siddiqui (Pharmacy Tech) on 22 at 1403  Med List Status: Complete   Medication Last Dose Status   acetaminophen (TYLENOL) 500 MG Tab FEW DAYS AGO Active   albuterol 108 (90 Base) MCG/ACT Aero Soln inhalation aerosol 2022 Active   allopurinol (ZYLOPRIM) 100 MG Tab 2022 Active   amoxicillin-clavulanate (AUGMENTIN) 875-125 MG Tab 2022 Active   benzonatate (TESSALON) 100 MG Cap 2022 Active   buPROPion SR (WELLBUTRIN-SR) 150 MG TABLET SR 12 HR sustained-release tablet 2022 Active   dexamethasone (DECADRON) 4 MG Tab COMPLETED Active   diclofenac sodium (VOLTAREN) 1 % Gel 2022 Active   Empagliflozin (JARDIANCE) 25 MG Tab 2022 Active   finasteride (PROSCAR) 5 MG Tab 2022 Active   fluticasone-salmeterol (ADVAIR) 100-50 MCG/DOSE AEROSOL POWDER, BREATH ACTIVATED FEW DAYS AGO Active   guaifenesin 200 MG tablet 2022 Active   hydrOXYzine HCl (ATARAX) 25 MG Tab 2022 Active   lidocaine (LIDODERM) 5 % Patch PRN Active   metFORMIN ER (GLUCOPHAGE XR) 500 MG TABLET SR 24 HR 2022 Active   montelukast (SINGULAIR) 10 MG Tab 2022  Active   pantoprazole (PROTONIX) 40 MG Tablet Delayed Response 4/14/2022 Active   tamsulosin (FLOMAX) 0.4 MG capsule 4/13/2022 Active                ALLERGIES  Allergies   Allergen Reactions   • Oxycodone Vomiting and Nausea   • Sulfa Drugs Rash     .       PHYSICAL EXAM  VITAL SIGNS: /65   Pulse 80   Temp 36.5 °C (97.7 °F) (Oral)   Resp 18   Ht 1.829 m (6')   Wt 90.7 kg (200 lb)   SpO2 97%   BMI 27.12 kg/m²      Constitutional: Well developed, well nourished; ill-appearing.  Non-toxic appearance.   HENT: Normocephalic, atraumatic; Bilateral external ears normal; oropharynx examination deferred due to COVID-19 outbreak and lack of oropharyngeal complaint thinks likely  Eyes: PERRL, EOMI, Conjunctiva normal. No discharge.   Neck:  Supple, nontender midline; No stridor; No nuchal rigidity.   Lymphatic: No cervical lymphadenopathy noted.   Cardiovascular: Regular rate and rhythm without murmurs, rubs, or gallop.   Thorax & Lungs: No respiratory distress.  Patient does have a nonrebreather mask on.  He is speaking in full sentences.  Decreased breath sounds throughout with crackles bilateral bases, right greater than left.  There is tenderness in the right lower anterior and anterolateral ribs.  No crepitus or subcutaneous air  Abdomen: Soft, nontender, bowel sounds normal. No obvious masses; No pulsatile masses; no rebound, guarding, or peritoneal signs.   Skin: Good color; warm and dry without rash or petechia.  Back: Nontender, No CVA tenderness.   Extremities: Distal radial, dorsalis pedis, posterior tibial pulses are equal bilaterally; No edema; Nontender calves or saphenous, No cyanosis, No clubbing.   Musculoskeletal: Good range of motion in all major joints. No tenderness to palpation or major deformities noted.   Neurologic: Alert & oriented x 4, clear speech      EKG  Please see my EKG interpretation below    RADIOLOGY/PROCEDURES  CT-CTA CHEST PULMONARY ARTERY W/ RECONS   Final Result      1.   Right worse than left extensive pulmonary abnormality compatible with subacute-chronic lung injury including bronchiectasis, bronchiolectasis, areas of volume loss and some groundglass that could indicate active infection versus hemorrhage or edema      2.  Small right pleural effusion      3.  No CT evidence of pulmonary embolism      DX-CHEST-PORTABLE (1 VIEW)   Final Result      No significant interval change in bilateral pneumonia.          COURSE & MEDICAL DECISION MAKING  Pertinent Labs & Imaging studies reviewed. (See chart for details)    Results for orders placed or performed during the hospital encounter of 04/14/22   CBC WITH DIFFERENTIAL   Result Value Ref Range    WBC 9.9 4.8 - 10.8 K/uL    RBC 4.30 (L) 4.70 - 6.10 M/uL    Hemoglobin 13.6 (L) 14.0 - 18.0 g/dL    Hematocrit 41.7 (L) 42.0 - 52.0 %    MCV 97.0 81.4 - 97.8 fL    MCH 31.6 27.0 - 33.0 pg    MCHC 32.6 (L) 33.7 - 35.3 g/dL    RDW 47.8 35.9 - 50.0 fL    Platelet Count 206 164 - 446 K/uL    MPV 10.0 9.0 - 12.9 fL    Neutrophils-Polys 80.80 (H) 44.00 - 72.00 %    Lymphocytes 7.30 (L) 22.00 - 41.00 %    Monocytes 7.00 0.00 - 13.40 %    Eosinophils 3.60 0.00 - 6.90 %    Basophils 0.60 0.00 - 1.80 %    Immature Granulocytes 0.70 0.00 - 0.90 %    Nucleated RBC 0.00 /100 WBC    Neutrophils (Absolute) 8.02 (H) 1.82 - 7.42 K/uL    Lymphs (Absolute) 0.73 (L) 1.00 - 4.80 K/uL    Monos (Absolute) 0.70 0.00 - 0.85 K/uL    Eos (Absolute) 0.36 0.00 - 0.51 K/uL    Baso (Absolute) 0.06 0.00 - 0.12 K/uL    Immature Granulocytes (abs) 0.07 0.00 - 0.11 K/uL    NRBC (Absolute) 0.00 K/uL   COMP METABOLIC PANEL   Result Value Ref Range    Sodium 138 135 - 145 mmol/L    Potassium 4.7 3.6 - 5.5 mmol/L    Chloride 102 96 - 112 mmol/L    Co2 26 20 - 33 mmol/L    Anion Gap 10.0 7.0 - 16.0    Glucose 128 (H) 65 - 99 mg/dL    Bun 21 8 - 22 mg/dL    Creatinine 1.32 0.50 - 1.40 mg/dL    Calcium 9.4 8.4 - 10.2 mg/dL    AST(SGOT) 19 12 - 45 U/L    ALT(SGPT) 22 2 - 50 U/L     Alkaline Phosphatase 95 30 - 99 U/L    Total Bilirubin 0.4 0.1 - 1.5 mg/dL    Albumin 3.6 3.2 - 4.9 g/dL    Total Protein 7.3 6.0 - 8.2 g/dL    Globulin 3.7 (H) 1.9 - 3.5 g/dL    A-G Ratio 1.0 g/dL   CoV-2, FLU A/B, and RSV by PCR (2-4 Hours CEPHEID) : Collect NP swab in VTM    Specimen: Respirate   Result Value Ref Range    Influenza virus A RNA Negative Negative    Influenza virus B, PCR Negative Negative    RSV, PCR Negative Negative    SARS-CoV-2 by PCR DETECTED (AA)     SARS-CoV-2 Source Nasal Swab    ESTIMATED GFR   Result Value Ref Range    GFR (CKD-EPI) 57 (A) >60 mL/min/1.73 m 2   TROPONIN   Result Value Ref Range    Troponin T 16 6 - 19 ng/L   PROCALCITONIN   Result Value Ref Range    Procalcitonin 0.14 <0.25 ng/mL   proBrain Natriuretic Peptide, NT   Result Value Ref Range    NT-proBNP 109 0 - 125 pg/mL   EKG (NOW)   Result Value Ref Range    Report       [facility] Emergency Dept.    Test Date:  2022  Pt Name:    PHYLICIA DOTY                 Department: [facility]  MRN:        [id number]                      [facility]  Gender:     Male                         Technician: ELICEO  :        [id number]                   Requested By:RADHA LINDSEY  Order #:    [id number]                    Reading MD: Radha Lindsey    Measurements  Intervals                                Axis  Rate:       79                           P:          37  KS:         167                          QRS:        -4  QRSD:       83                           T:          33  QT:         375  QTc:        430    Interpretive Statements  Sinus rhythm rate 79  Normal axis  Normal intervals  Increased artifact  No ST elevation or depression  Probable left atrial enlargement  Compared to ECG 2022 13:15:55  Ventricular premature complex(es) no longer present  Left ventricular hypertrophy no longer present  Electronically Signed On 2022 16 :16:55 PDT by Radha Lindsey       Patient presents to the ER after a  "syncopal episode.  He passed out while going to the door to meet his home health nurses.  He \"turned gray\" and then passed out.  He was out for just a few seconds.  He was just discharged from the [facility] yesterday after being admitted there for several days after presenting there with chills over the weekend.  He is status post COVID infection.  He was admitted with COVID on March 9 and discharged from the hospital on March 30.  He was discharged with home oxygen.  Over the last couple weeks his oxygen requirement has been increasing.  He was discharged on 4 L of oxygen 24/7.  Now he is on 8 L with any sort of exertion and is on 4 L while at rest or is while sleeping.  Today he presented via EMS on a nonrebreather mask.  We able to dial him down to a 8 L/min oxi mask.  He says last night his O2 sat was 82% on his 4 L.  I was concerned about pulmonary emboli given patient's post-COVID status.  There is no PE on CT scan.  However, he does have extensive infiltrate and lung injury, likely secondary to pulmonary fibrosis and COVID per Dr. Churchill, who reviewed the patient's CT scan.  Patient will need to be hospitalized for his syncope and his worsening hypoxia.  I spoke with the hospitalist on-call and he will kindly evaluate the patient for hospitalization.    1615: [facility] pulmonary and hospitalist    1630: Discussed with Dr. Barbosa, pulmonologist on-call.  She is reviewed the patient CT scan.  She thinks a lot of the findings are fibrosis.  She thinks he likely has worsening fibrosis as a result of the COVID.  She does not think this is hemorrhage.  She recommends dexamethasone 6 mg p.o.  She recommends adding a procalcitonin.  She does not think the patient needs antibiotics unless his procalcitonin is  elevated.  She recommends being judicious with fluid administration.      1630: Discussed with Dr. Harvey, Hospitalist on-call, he will kindly evaluate the patient for hospitalization.     CRITICAL " CARE  The very real possibilty of a deterioration of this patient's condition required the highest level of my preparedness for sudden, emergent intervention.  I provided critical care services, which included medication orders, frequent reevaluations of the patient's condition and response to treatment, ordering and reviewing test results, and discussing the case with various consultants.  The critical care time associated with the care of the patient was 35 minutes. Review chart for interventions. This time is exclusive of any other billable procedures.         I verified that the patient was wearing a mask and I was wearing appropriate PPE every time I entered the room. The patient's mask was on the patient at all times during my encounter     FINAL IMPRESSION  1. Pulmonary infiltrates Acute    2. Syncope, unspecified syncope type Acute    3. COVID-19 Acute    4. Hypoxia Acute    5.  Covid pneumonia    The critical care time associated with the care of the patient was 35 minutes.  This dictation has been created using voice recognition software. The accuracy of the dictation is limited by the abilities of the software. I expect there may be some errors of grammar and possibly content. I made every attempt to manually correct the errors within my dictation. However, errors related to voice recognition software may still exist and should be interpreted within the appropriate context.    Electronically signed by: Yvonne Portillo M.D., 4/14/2022 2:10 PM

## 2022-04-14 NOTE — H&P
Hospital Medicine History & Physical Note    Date of Service  4/14/2022    Primary Care Physician  Dyllan Valencia M.D.    Consultants  Dr. Barbosa pulmonologist     Code Status  Full Code    Chief Complaint  Chief Complaint   Patient presents with   • Shortness of Breath   • Cough   • Diarrhea   • Dizziness     Diarrhea a few days ago,  SOB increased since last night on home oxygen at 6-8 liters increased demands today  Ambulating with home health RN today and had a syncopal episode  + Covid March 9th admitted until March 30th   Diagnosed with pneumonia last week      History of Presenting Illness  Juan José Pate is a 72 y.o. male with a past medical history of chronic kidney disease stage III, gastroesophageal reflux disease who presented 4/14/2022 with an episode of syncope and worsening shortness of breath.  [facility] and was just discharged recently on 4 L of oxygen.  However patient continued to have worsening shortness of breath on the day of admission he had a syncopal episode without prior palpitations chest pain dizziness or lightheadedness.  In addition, his oxygen requirement increased, reportedly he was saturating 82% on 8 L of oxygen.  Patient denies noticing having any extremity pain redness or swelling.     I discussed the plan of care with emergency department physician, the patient and his daughter was present at bedside in the emergency room    Review of Systems  Review of Systems   Constitutional: Positive for chills and malaise/fatigue. Negative for fever.   Eyes: Negative for discharge and redness.   Respiratory: Positive for cough and shortness of breath. Negative for stridor.    Cardiovascular: Negative for chest pain and leg swelling.        Syncope   Gastrointestinal: Negative for abdominal pain and vomiting.   Genitourinary: Negative for flank pain.   Musculoskeletal: Negative for myalgias.   Skin: Negative.    Neurological:  Negative for focal weakness.   Endo/Heme/Allergies: Does not bruise/bleed easily.   Psychiatric/Behavioral: The patient is not nervous/anxious.      Past Medical History   has a past medical history of Asthma, Chronic obstructive pulmonary disease (HCC), COVID, Diabetes (HCC), and Hypertension.    Surgical History  Dental extractions    Family History  family history includes Hypertension in his father.     Social History   reports that he quit smoking about 47 years ago. He quit after 6.00 years of use. He has never used smokeless tobacco. He reports that he does not drink alcohol and does not use drugs.    Allergies  Allergies   Allergen Reactions   • Oxycodone Vomiting and Nausea   • Sulfa Drugs Rash     .     Medications  Prior to Admission Medications   Prescriptions Last Dose Informant Patient Reported? Taking?   Empagliflozin (JARDIANCE) 25 MG Tab 4/14/2022 at AM Patient Yes Yes   Sig: Take 12.5 mg by mouth every day.   acetaminophen (TYLENOL) 500 MG Tab FEW DAYS AGO at UNK Patient Yes No   Sig: Take 500 mg by mouth every 6 hours as needed for Moderate Pain.   albuterol 108 (90 Base) MCG/ACT Aero Soln inhalation aerosol 4/14/2022 at 0400 Patient Yes No   Sig: Inhale 1-2 Puffs every four hours as needed for Shortness of Breath.   allopurinol (ZYLOPRIM) 100 MG Tab 4/13/2022 at PM Patient Yes No   Sig: Take 200 mg by mouth every evening.   amoxicillin-clavulanate (AUGMENTIN) 875-125 MG Tab 4/14/2022 at AM Patient Yes Yes   Sig: Take 1 Tablet by mouth 2 times a day. 9 tab course   benzonatate (TESSALON) 100 MG Cap 4/13/2022 at PM Patient Yes No   Sig: Take 200 mg by mouth 3 times a day as needed. Indications: Cough   buPROPion SR (WELLBUTRIN-SR) 150 MG TABLET SR 12 HR sustained-release tablet 4/14/2022 at AM Patient Yes Yes   Sig: Take 150 mg by mouth 2 times a day.   dexamethasone (DECADRON) 4 MG Tab COMPLETED at COMPLETED Patient Yes No   Sig: Take 6 mg by mouth every day. Pt started on 4/1/2022 for 3 days  [facility]   diclofenac sodium (VOLTAREN) 1 % Gel 4/14/2022 at 0300 Patient Yes No   Sig: Apply 2 g topically 4 times a day as needed (Apply's on shoulders for pain).   finasteride (PROSCAR) 5 MG Tab 4/13/2022 at PM Patient Yes No   Sig: Take 5 mg by mouth every evening.   fluticasone-salmeterol (ADVAIR) 100-50 MCG/DOSE AEROSOL POWDER, BREATH ACTIVATED FEW DAYS AGO at UNK Patient Yes No   Sig: Inhale 1 Puff every day.   guaifenesin 200 MG tablet 4/14/2022 at AM Patient Yes No   Sig: Take 400 mg by mouth in the morning, at noon, and at bedtime.   hydrOXYzine HCl (ATARAX) 25 MG Tab 4/13/2022 at PM Patient Yes Yes   Sig: Take 25 mg by mouth 1 time a day as needed for Anxiety.   lidocaine (LIDODERM) 5 % Patch PRN at PRN Patient Yes No   Sig: Place 1 Patch on the skin 2 times a day as needed (Apply's on both shoulders for pain).   metFORMIN ER (GLUCOPHAGE XR) 500 MG TABLET SR 24 HR 4/13/2022 at PM Patient Yes No   Sig: Take 2,000 mg by mouth every evening.   montelukast (SINGULAIR) 10 MG Tab 4/13/2022 at PM Patient Yes No   Sig: Take 10 mg by mouth every evening.   pantoprazole (PROTONIX) 40 MG Tablet Delayed Response 4/14/2022 at AM Patient Yes Yes   Sig: Take 40 mg by mouth every day.   tamsulosin (FLOMAX) 0.4 MG capsule 4/13/2022 at PM Patient Yes No   Sig: Take 0.4 mg by mouth every day.      Facility-Administered Medications: None     Physical Exam  Temp:  [36.1 °C (96.9 °F)-36.6 °C (97.8 °F)] 36.5 °C (97.7 °F)  Pulse:  [] 80  Resp:  [18-36] 18  BP: (100-128)/(56-72) 109/65  SpO2:  [94 %-98 %] 97 %  Blood Pressure : 128/69   Temperature: 36.1 °C (96.9 °F)   Pulse: 83   Respiration: (!) 24   Pulse Oximetry: 97 %     Physical Exam  Constitutional:       General: He is not in acute distress.     Appearance: He is not ill-appearing or diaphoretic.   HENT:      Head: Atraumatic.      Right Ear: External ear normal.      Left Ear: External ear normal.      Nose: No congestion or rhinorrhea.       Mouth/Throat:      Mouth: Mucous membranes are dry.   Eyes:      General: No scleral icterus.        Right eye: No discharge.         Left eye: No discharge.      Pupils: Pupils are equal, round, and reactive to light.   Cardiovascular:      Rate and Rhythm: Regular rhythm. Tachycardia present.   Pulmonary:      Comments: Requiring 6 L of oxygen to achieve adequate saturation.  Tachypnea.  Not able to speak in full sentences without pausing to take a deep breath  Abdominal:      General: There is no distension.   Musculoskeletal:      Cervical back: Neck supple. No rigidity. No muscular tenderness.      Right lower leg: No edema.      Left lower leg: No edema.   Skin:     General: Skin is dry.      Capillary Refill: Capillary refill takes 2 to 3 seconds.      Coloration: Skin is pale. Skin is not jaundiced.   Neurological:      Mental Status: He is alert and oriented to person, place, and time.      Coordination: Coordination normal.   Psychiatric:         Mood and Affect: Mood normal.         Behavior: Behavior normal.       Laboratory:  Recent Labs     04/14/22  1345   WBC 9.9   RBC 4.30*   HEMOGLOBIN 13.6*   HEMATOCRIT 41.7*   MCV 97.0   MCH 31.6   MCHC 32.6*   RDW 47.8   PLATELETCT 206   MPV 10.0     Recent Labs     04/14/22  1345   SODIUM 138   POTASSIUM 4.7   CHLORIDE 102   CO2 26   GLUCOSE 128*   BUN 21   CREATININE 1.32   CALCIUM 9.4     Recent Labs     04/14/22  1345   ALTSGPT 22   ASTSGOT 19   ALKPHOSPHAT 95   TBILIRUBIN 0.4   GLUCOSE 128*         Recent Labs     04/14/22  1345   NTPROBNP 109         Recent Labs     04/14/22  1345   TROPONINT 16     Imaging:  CT-CTA CHEST PULMONARY ARTERY W/ RECONS   Final Result      1.  Right worse than left extensive pulmonary abnormality compatible with subacute-chronic lung injury including bronchiectasis, bronchiolectasis, areas of volume loss and some groundglass that could indicate active infection versus hemorrhage or edema      2.  Small right pleural  effusion      3.  No CT evidence of pulmonary embolism      DX-CHEST-PORTABLE (1 VIEW)   Final Result      No significant interval change in bilateral pneumonia.        I personally reviewed patient's EKG, it shows a sinus rhythm with a rate of 79, there is no ST elevation, there is T wave inversions in lead V1-V6.  QTc 430.        Assessment/Plan:  I anticipate this patient will require at least two midnights for appropriate medical management, necessitating inpatient admission.    * Syncope and collapse- (present on admission)  Assessment & Plan  EKG shows a sinus rhythm with a rate of 79, there is no ST elevation, there is T wave inversions in lead V1-V6.  QTc 430.  Orthostatic vital  Telemetry monitor  I will check an echocardiography    Pneumonia- (present on admission)  Assessment & Plan  CT scan of the chest shows evidence for right worse than left extensive pulmonary abnormality compatible with subacute-chronic lung injury including bronchiectasis, bronchiolectasis, areas of volume loss and some groundglass that could indicate active infection versus hemorrhage or edema  Less likely pulmonary hemorrhage, as his Hemoglobin is higher than prior baseline, also patient has chills  This is likely worsening COVID-pneumonia versus superimposed bacterial  Started on Decadron in the emergency room, continue for now follow cultures and sensitivities  I will check procalcitonin, if elevated I will start empiric antibiotics     Sepsis (HCC)- (present on admission)  Assessment & Plan  This is Sepsis Present on admission  SIRS criteria identified on my evaluation include: Tachycardia, with heart rate greater than 90 BPM and Tachypnea, with respirations greater than 20 per minute  Source is pneumonia  Sepsis protocol initiated  Fluid resuscitation ordered per protocol  Crystalloid Fluid Administration: Fluid resuscitation ordered per standard protocol - 30 mL/kg per current or ideal body weight  Decadron started for  COVID-pneumonia  Reassessment: I have reassessed the patient's hemodynamic status    Acute on chronic respiratory failure with hypoxia (HCC)- (present on admission)  Assessment & Plan  Likely multifactorial differentials include worsening COVID-pneumonia, secondary bacterial infection, heart failure, pulmonary hemorrhage.  Rule out COVID-19, check procalcitonin, will check echo & BNP  EDP will consult pulmonary  Oxygen as needed, Respiratory protocol, Bronchodilators, Incentive spirometry    Syncope- (present on admission)  Assessment & Plan  EKG shows a sinus rhythm with a rate of 79, there is no ST elevation, there is T wave inversions in lead V1-V6.  QTc 430.  Orthostatic vital  Telemetry monitor  I will check an echocardiography    Stage 3a chronic kidney disease (HCC)- (present on admission)  Assessment & Plan  Avoid/minimize nephrotoxins as much as possible, renally dose medications, monitor inputs and outputs     Diarrhea- (present on admission)  Assessment & Plan  Differentials include related to his COVID infection, other viral infections, C. difficile colitis [has been on antibiotics]  I will check stool for C. difficile toxins.  Supportive care with intravenous fluids monitor electrolytes and replace accordingly     Gastroesophageal reflux disease without esophagitis- (present on admission)  Assessment & Plan  Resume home pantoprazole    VTE prophylaxis: SCDs, prophylactic dose paroxetine

## 2022-04-14 NOTE — ED NOTES
Med rec completed per pt and med list (returned)  Allergies reviewed    Pt started a course of Augmentin last night 4/13/2022

## 2022-04-14 NOTE — ED TRIAGE NOTES
71 yo male BIB ambulance from home in [place] with increased SOB and syncopal this AM while walking with home health nurse.  + right lower rib pain.      Patient had the Samuel and Samuel vaccine   Chief Complaint   Patient presents with   • Shortness of Breath   • Cough   • Diarrhea   • Dizziness     Diarrhea a few days ago,  SOB increased since last night on home oxygen at 6-8 liters increased demands today  Ambulating with home health RN today and had a syncopal episode  + Covid March 9th admitted until March 30th   Diagnosed with pneumonia last week    /64   Pulse (!) 104   Temp 36.1 °C (96.9 °F) (Temporal)   Resp (!) 36   Ht 1.829 m (6')   Wt 90.7 kg (200 lb)   SpO2 94%   BMI 27.12 kg/m²

## 2022-04-14 NOTE — ED NOTES
Pt rounding upon return from ct-ice bag provided per pt request for soreness to rt chest-no obvious trauma noted

## 2022-04-15 PROBLEM — U07.1 PNEUMONIA DUE TO COVID-19 VIRUS: Status: ACTIVE | Noted: 2022-04-14

## 2022-04-15 PROBLEM — J12.82 PNEUMONIA DUE TO COVID-19 VIRUS: Status: ACTIVE | Noted: 2022-04-14

## 2022-04-15 LAB
ALBUMIN SERPL BCP-MCNC: 3.4 G/DL (ref 3.2–4.9)
ALBUMIN/GLOB SERPL: 0.9 G/DL
ALP SERPL-CCNC: 91 U/L (ref 30–99)
ALT SERPL-CCNC: 20 U/L (ref 2–50)
ANION GAP SERPL CALC-SCNC: 11 MMOL/L (ref 7–16)
AST SERPL-CCNC: 18 U/L (ref 12–45)
BASOPHILS # BLD AUTO: 0.1 % (ref 0–1.8)
BASOPHILS # BLD: 0.01 K/UL (ref 0–0.12)
BILIRUB SERPL-MCNC: 0.4 MG/DL (ref 0.1–1.5)
BUN SERPL-MCNC: 20 MG/DL (ref 8–22)
CALCIUM SERPL-MCNC: 9.4 MG/DL (ref 8.4–10.2)
CHLORIDE SERPL-SCNC: 101 MMOL/L (ref 96–112)
CO2 SERPL-SCNC: 23 MMOL/L (ref 20–33)
CREAT SERPL-MCNC: 1.19 MG/DL (ref 0.5–1.4)
EOSINOPHIL # BLD AUTO: 0.01 K/UL (ref 0–0.51)
EOSINOPHIL NFR BLD: 0.1 % (ref 0–6.9)
ERYTHROCYTE [DISTWIDTH] IN BLOOD BY AUTOMATED COUNT: 47 FL (ref 35.9–50)
GFR SERPLBLD CREATININE-BSD FMLA CKD-EPI: 64 ML/MIN/1.73 M 2
GLOBULIN SER CALC-MCNC: 3.7 G/DL (ref 1.9–3.5)
GLUCOSE BLD STRIP.AUTO-MCNC: 105 MG/DL (ref 65–99)
GLUCOSE BLD STRIP.AUTO-MCNC: 121 MG/DL (ref 65–99)
GLUCOSE BLD STRIP.AUTO-MCNC: 136 MG/DL (ref 65–99)
GLUCOSE BLD STRIP.AUTO-MCNC: 149 MG/DL (ref 65–99)
GLUCOSE SERPL-MCNC: 133 MG/DL (ref 65–99)
HCT VFR BLD AUTO: 41.2 % (ref 42–52)
HGB BLD-MCNC: 13.4 G/DL (ref 14–18)
IMM GRANULOCYTES # BLD AUTO: 0.06 K/UL (ref 0–0.11)
IMM GRANULOCYTES NFR BLD AUTO: 0.7 % (ref 0–0.9)
LACTATE BLD-SCNC: 1.2 MMOL/L (ref 0.5–2)
LACTATE BLD-SCNC: 1.4 MMOL/L (ref 0.5–2)
LYMPHOCYTES # BLD AUTO: 0.45 K/UL (ref 1–4.8)
LYMPHOCYTES NFR BLD: 5.3 % (ref 22–41)
MAGNESIUM SERPL-MCNC: 2.2 MG/DL (ref 1.5–2.5)
MCH RBC QN AUTO: 31.5 PG (ref 27–33)
MCHC RBC AUTO-ENTMCNC: 32.5 G/DL (ref 33.7–35.3)
MCV RBC AUTO: 96.7 FL (ref 81.4–97.8)
MONOCYTES # BLD AUTO: 0.38 K/UL (ref 0–0.85)
MONOCYTES NFR BLD AUTO: 4.5 % (ref 0–13.4)
NEUTROPHILS # BLD AUTO: 7.53 K/UL (ref 1.82–7.42)
NEUTROPHILS NFR BLD: 89.3 % (ref 44–72)
NRBC # BLD AUTO: 0 K/UL
NRBC BLD-RTO: 0 /100 WBC
PLATELET # BLD AUTO: 216 K/UL (ref 164–446)
PMV BLD AUTO: 10.1 FL (ref 9–12.9)
POTASSIUM SERPL-SCNC: 4.9 MMOL/L (ref 3.6–5.5)
PROT SERPL-MCNC: 7.1 G/DL (ref 6–8.2)
RBC # BLD AUTO: 4.26 M/UL (ref 4.7–6.1)
SCCMEC + MECA PNL NOSE NAA+PROBE: NEGATIVE
SODIUM SERPL-SCNC: 135 MMOL/L (ref 135–145)
WBC # BLD AUTO: 8.4 K/UL (ref 4.8–10.8)

## 2022-04-15 PROCEDURE — 94640 AIRWAY INHALATION TREATMENT: CPT

## 2022-04-15 PROCEDURE — 94760 N-INVAS EAR/PLS OXIMETRY 1: CPT

## 2022-04-15 PROCEDURE — 85025 COMPLETE CBC W/AUTO DIFF WBC: CPT

## 2022-04-15 PROCEDURE — 700102 HCHG RX REV CODE 250 W/ 637 OVERRIDE(OP): Performed by: HOSPITALIST

## 2022-04-15 PROCEDURE — A9270 NON-COVERED ITEM OR SERVICE: HCPCS | Performed by: HOSPITALIST

## 2022-04-15 PROCEDURE — 99233 SBSQ HOSP IP/OBS HIGH 50: CPT | Performed by: INTERNAL MEDICINE

## 2022-04-15 PROCEDURE — 83605 ASSAY OF LACTIC ACID: CPT | Mod: 91

## 2022-04-15 PROCEDURE — 83735 ASSAY OF MAGNESIUM: CPT

## 2022-04-15 PROCEDURE — 700111 HCHG RX REV CODE 636 W/ 250 OVERRIDE (IP): Performed by: INTERNAL MEDICINE

## 2022-04-15 PROCEDURE — 770020 HCHG ROOM/CARE - TELE (206)

## 2022-04-15 PROCEDURE — 82962 GLUCOSE BLOOD TEST: CPT | Mod: 91

## 2022-04-15 PROCEDURE — 80053 COMPREHEN METABOLIC PANEL: CPT

## 2022-04-15 RX ORDER — FUROSEMIDE 10 MG/ML
40 INJECTION INTRAMUSCULAR; INTRAVENOUS
Status: DISCONTINUED | OUTPATIENT
Start: 2022-04-15 | End: 2022-04-18

## 2022-04-15 RX ADMIN — BENZONATATE 200 MG: 100 CAPSULE ORAL at 10:42

## 2022-04-15 RX ADMIN — BENZONATATE 200 MG: 100 CAPSULE ORAL at 21:41

## 2022-04-15 RX ADMIN — BUPROPION HYDROCHLORIDE 150 MG: 150 TABLET, EXTENDED RELEASE ORAL at 05:54

## 2022-04-15 RX ADMIN — TRAMADOL HYDROCHLORIDE 50 MG: 50 TABLET, COATED ORAL at 21:41

## 2022-04-15 RX ADMIN — MONTELUKAST 10 MG: 10 TABLET, FILM COATED ORAL at 18:06

## 2022-04-15 RX ADMIN — BUDESONIDE AND FORMOTEROL FUMARATE DIHYDRATE 2 PUFF: 80; 4.5 AEROSOL RESPIRATORY (INHALATION) at 19:03

## 2022-04-15 RX ADMIN — BUDESONIDE AND FORMOTEROL FUMARATE DIHYDRATE 2 PUFF: 80; 4.5 AEROSOL RESPIRATORY (INHALATION) at 08:24

## 2022-04-15 RX ADMIN — DEXAMETHASONE 6 MG: 4 TABLET ORAL at 05:54

## 2022-04-15 RX ADMIN — BUPROPION HYDROCHLORIDE 150 MG: 150 TABLET, EXTENDED RELEASE ORAL at 18:06

## 2022-04-15 RX ADMIN — FUROSEMIDE 40 MG: 10 INJECTION, SOLUTION INTRAMUSCULAR; INTRAVENOUS at 18:06

## 2022-04-15 RX ADMIN — OMEPRAZOLE 20 MG: 20 CAPSULE, DELAYED RELEASE ORAL at 05:54

## 2022-04-15 RX ADMIN — RIVAROXABAN 10 MG: 10 TABLET, FILM COATED ORAL at 05:54

## 2022-04-15 RX ADMIN — FINASTERIDE 5 MG: 5 TABLET, FILM COATED ORAL at 18:06

## 2022-04-15 RX ADMIN — ALLOPURINOL 200 MG: 100 TABLET ORAL at 18:06

## 2022-04-15 RX ADMIN — TRAMADOL HYDROCHLORIDE 50 MG: 50 TABLET, COATED ORAL at 03:18

## 2022-04-15 RX ADMIN — TAMSULOSIN HYDROCHLORIDE 0.4 MG: 0.4 CAPSULE ORAL at 18:06

## 2022-04-15 ASSESSMENT — FIBROSIS 4 INDEX: FIB4 SCORE: 1.36

## 2022-04-15 ASSESSMENT — ENCOUNTER SYMPTOMS
COUGH: 1
SHORTNESS OF BREATH: 1

## 2022-04-15 ASSESSMENT — PAIN DESCRIPTION - PAIN TYPE
TYPE: ACUTE PAIN

## 2022-04-15 NOTE — ASSESSMENT & PLAN NOTE
"Likely due from worsening COVID-19 pneumonia.  Discussed with infection control, is considered an old infection, and any precautions.  Pulmonary apparently consulted on admission, follow recommendations  Oxygen as needed, Respiratory protocol, Bronchodilators, Incentive spirometry  Continue with Lasix 40 mg IV twice daily, continue to monitor necessity daily  Has been weaned down to 4 L 02, however required up to 15 L today after presyncopal episodes.  Likely vasovagal response while in the bathroom.  We will continue to monitor closely.  Given patient's difficulty weaning, if unable to wean below 8 L with ambulation may require long-term placement in LTAC or SNF, will need to be reevaluated daily.  Repeat chest x-ray, D-dimer, stat labs and EKG all within normal limits  Repeat walk test in a.m. and continue to wean as tolerated\"    Needs O2.  Ordered home monitoring, HHC and O2.  "

## 2022-04-15 NOTE — ASSESSMENT & PLAN NOTE
Differentials include related to his COVID infection, other viral infections, C. difficile colitis [has been on antibiotics]  I will check stool for C. difficile toxins.  Supportive care with intravenous fluids monitor electrolytes and replace accordingly   No episodes since patient has been in the hospital

## 2022-04-15 NOTE — PROGRESS NOTES
"Hospital Medicine Daily Progress Note    Date of Service  4/15/2022    Chief Complaint  Juan José Pate is a 72 y.o. male admitted 4/14/2022 with worsening shortness of breath    Hospital Course  Per notes, \"72 y.o. male with a past medical history of chronic kidney disease stage III, gastroesophageal reflux disease who presented 4/14/2022 with an episode of syncope and worsening shortness of breath.  [facility] and was just discharged recently on 4 L of oxygen.  However patient continued to have worsening shortness of breath on the day of admission he had a syncopal episode without prior palpitations chest pain dizziness or lightheadedness.  In addition, his oxygen requirement increased, reportedly he was saturating 82% on 8 L of oxygen.  Patient denies noticing having any extremity pain redness or swelling. \"      Interval Problem Update  Patient was initially diagnosed with Covid on March 12, has been in the hospital and on 4 L of oxygen since then.  Has acute worsening.  Walk test completed today and still requiring up to 10 L, not safe to discharge at this time.  Did have some crackles so we will diurese.    I have personally seen and examined the patient at bedside. I discussed the plan of care with patient and bedside RN.    Consultants/Specialty  None    Code Status  Full Code    Disposition  Patient is not medically cleared for discharge.   Anticipate discharge to to home with close outpatient follow-up.  I have placed the appropriate orders for post-discharge needs.    Review of Systems  Review of Systems   Constitutional: Positive for malaise/fatigue.   Respiratory: Positive for cough and shortness of breath.    All other systems reviewed and are negative.       Physical Exam  Temp:  [36.3 °C (97.3 °F)-36.8 °C (98.3 °F)] 36.5 °C (97.7 °F)  Pulse:  [] 89  Resp:  [18-34] 18  BP: (102-168)/() 127/57  SpO2:  [91 %-97 %] 91 %    Physical " Exam  Vitals and nursing note reviewed.   Constitutional:       Appearance: Normal appearance.   Cardiovascular:      Rate and Rhythm: Normal rate and regular rhythm.      Pulses: Normal pulses.      Heart sounds: Normal heart sounds.   Pulmonary:      Effort: Respiratory distress present.      Breath sounds: Normal breath sounds.      Comments: Bilateral crackles  Abdominal:      General: Abdomen is flat. Bowel sounds are normal.      Palpations: Abdomen is soft.   Musculoskeletal:      Right lower leg: No edema.      Left lower leg: No edema.   Skin:     Findings: No lesion or rash.   Neurological:      General: No focal deficit present.      Mental Status: He is alert and oriented to person, place, and time. Mental status is at baseline.         Fluids    Intake/Output Summary (Last 24 hours) at 4/15/2022 1548  Last data filed at 4/15/2022 0800  Gross per 24 hour   Intake 200 ml   Output 1500 ml   Net -1300 ml       Laboratory  Recent Labs     04/14/22  1345 04/15/22  0115   WBC 9.9 8.4   RBC 4.30* 4.26*   HEMOGLOBIN 13.6* 13.4*   HEMATOCRIT 41.7* 41.2*   MCV 97.0 96.7   MCH 31.6 31.5   MCHC 32.6* 32.5*   RDW 47.8 47.0   PLATELETCT 206 216   MPV 10.0 10.1     Recent Labs     04/14/22  1345 04/15/22  0115   SODIUM 138 135   POTASSIUM 4.7 4.9   CHLORIDE 102 101   CO2 26 23   GLUCOSE 128* 133*   BUN 21 20   CREATININE 1.32 1.19   CALCIUM 9.4 9.4                   Imaging  CT-CTA CHEST PULMONARY ARTERY W/ RECONS   Final Result      1.  Right worse than left extensive pulmonary abnormality compatible with subacute-chronic lung injury including bronchiectasis, bronchiolectasis, areas of volume loss and some groundglass that could indicate active infection versus hemorrhage or edema      2.  Small right pleural effusion      3.  No CT evidence of pulmonary embolism      DX-CHEST-PORTABLE (1 VIEW)   Final Result      No significant interval change in bilateral pneumonia.           Assessment/Plan  * Syncope and collapse-  (present on admission)  Assessment & Plan  EKG shows a sinus rhythm with a rate of 79, there is no ST elevation, there is T wave inversions in lead V1-V6.  QTc 430.  Orthostatic vital  Telemetry monitor  I will check an echocardiography    Pneumonia due to COVID-19 virus- (present on admission)  Assessment & Plan  CT scan of the chest shows evidence for right worse than left extensive pulmonary abnormality compatible with subacute-chronic lung injury including bronchiectasis, bronchiolectasis, areas of volume loss and some groundglass that could indicate active infection versus hemorrhage or edema  Less likely pulmonary hemorrhage, as his Hemoglobin is higher than prior baseline, also patient has chills  This is likely worsening COVID-pneumonia versus superimposed bacterial  Procalcitonin not significantly elevated, D-dimer not elevated.  Worsening COVID-19 pneumonia.  No need for antibiotics  We will continue with steroids and Lasix    Sepsis (HCC)- (present on admission)  Assessment & Plan  This is Sepsis Present on admission  SIRS criteria identified on my evaluation include: Tachycardia, with heart rate greater than 90 BPM and Tachypnea, with respirations greater than 20 per minute  Source is pneumonia  Sepsis protocol initiated  Fluid resuscitation ordered per protocol  Crystalloid Fluid Administration: Fluid resuscitation ordered per standard protocol - 30 mL/kg per current or ideal body weight  Decadron started for COVID-pneumonia  Reassessment: I have reassessed the patient's hemodynamic status    Gastroesophageal reflux disease without esophagitis- (present on admission)  Assessment & Plan  Resume home pantoprazole    Stage 3a chronic kidney disease (HCC)- (present on admission)  Assessment & Plan  Avoid/minimize nephrotoxins as much as possible, renally dose medications, monitor inputs and outputs     Acute on chronic respiratory failure with hypoxia (HCC)- (present on admission)  Assessment & Plan  Likely  due from worsening COVID-19 pneumonia.  Discussed with infection control, is considered an old infection, and any precautions.  Pulmonary apparently consulted on admission, follow recommendations  Oxygen as needed, Respiratory protocol, Bronchodilators, Incentive spirometry  We will start Lasix 40 mg IV twice daily, continue to monitor necessity daily    Diarrhea- (present on admission)  Assessment & Plan  Differentials include related to his COVID infection, other viral infections, C. difficile colitis [has been on antibiotics]  I will check stool for C. difficile toxins.  Supportive care with intravenous fluids monitor electrolytes and replace accordingly   No episodes since patient has been in the hospital    Syncope- (present on admission)  Assessment & Plan  EKG shows a sinus rhythm with a rate of 79, there is no ST elevation, there is T wave inversions in lead V1-V6.  QTc 430.  Orthostatic vital  Telemetry monitor  Follow echocardiogram results       VTE prophylaxis: SCDs/TEDs    I have performed a physical exam and reviewed and updated ROS and Plan today (4/15/2022). In review of yesterday's note (4/14/2022), there are no changes except as documented above.

## 2022-04-15 NOTE — ASSESSMENT & PLAN NOTE
"EKG shows a sinus rhythm with a rate of 79, there is no ST elevation, there is T wave inversions in lead V1-V6.  QTc 430.  Orthostatic vital  Telemetry monitor  Echocardiogram showed normal wall motion, EF of 55%.\"    Outpatient Cardiology for Holter/event monitor  "

## 2022-04-15 NOTE — DISCHARGE PLANNING
Agency/Facility Name: [facility]  Spoke To: Aidan   Outcome: Pt is on service with [facility] agency services

## 2022-04-15 NOTE — CARE PLAN
The patient is Stable - Low risk of patient condition declining or worsening    Shift Goals  Clinical Goals: c. diff rule out, monitor resp. status  Patient Goals: rest    Progress made toward(s) clinical / shift goals:  Patient on 6 L oxymask satting mid 90's but does require more o2 when ambulating. Pending stool sample for c. diff rule out. Patient reports pain to his right lower rib cage from syncopal episode where he fell on his walker-- medicated per MAR. Bed and strip alarm on. Call light within reach.       Problem: Knowledge Deficit - Standard  Goal: Patient and family/care givers will demonstrate understanding of plan of care, disease process/condition, diagnostic tests and medications  Outcome: Progressing     Problem: Hemodynamics  Goal: Patient's hemodynamics, fluid balance and neurologic status will be stable or improve  Outcome: Progressing     Problem: Fluid Volume  Goal: Fluid volume balance will be maintained  Outcome: Progressing     Problem: Urinary - Renal Perfusion  Goal: Ability to achieve and maintain adequate renal perfusion and functioning will improve  Outcome: Progressing     Problem: Respiratory  Goal: Patient will achieve/maintain optimum respiratory ventilation and gas exchange  Outcome: Progressing       Patient is not progressing towards the following goals:

## 2022-04-15 NOTE — PROGRESS NOTES
72M, PMHx DM, CKD, COPD, GERD, recent COVID PNA   Here w syncope, & Worsening SOB likely has pneumonia  I evaluated the patient at bedside.  Patient will need admission, pending lactic acid to determine level of care.

## 2022-04-15 NOTE — DIETARY
Nutrition services: Day 1 of admit.  Juan José Pate is a 72 y.o. male with admitting DX of Syncope and collapse.    Consult received for MST 2 for 2-13 lb wt loss in 3 months w/ poor PO intake. Due to department policy regarding COVID+ patients/droplet isolation pre-cautions, RD unable to interview patient at bedside. RD unable to reach pt via room phone. Able to contact pt's wife, Анна, on home phone given in EMR. Pt's , name confirmed by Анна.     Анна reports pt has lost wt from  lb w/ COVID-19 illness over past month. [facility], thinks he was eating ~50% of his usual intake during that time. On DC home, was somewhat improving w/ PO intake, then [facility] to [facility]. On second discharge, only home for a very short period before current admit. Of note, Анна reports pt has an allergy to teriyaki w/ reaction of wheezing/respiratory distress; no other foods or any specific constituents of teriyaki sauce identified as allergens by pt's wife.     Assessment:  Height: 182.9 cm (6')  Weight: 88.7 kg (195 lb 8.8 oz) - via bed scale 4/15  Body mass index is 26.52 kg/m²., BMI classification: Overweight  Diet/Intake: Cardiac, Consistent CHO: % x 1 meal    Evaluation:   1. PMHx includes CKD stage III, GERD, COVID infection, COPD, DM, HTN. Dx list includes syncope and collapse, PNA, sepsis, acute on chronic respiratory failure w/ hypoxia.  2. Labs: glu 133, POC glu 105-149   3. MAR: decadron, SSI (dose not req/not given this admit),   4. Last BM: PTA    Malnutrition Risk: Pt meets criteria for severe acute malnutrition related to COVID-19 infection x 1 month as evidenced by reported PO intake of 50% of normal intake for >/=3 weeks and report of wt loss from  lbs to admit stand up scale wt of 200 lbs (7%) in 1 month, which is severe.    Recommendations/Plan:  1. RD updated Epic allergies/kitchen software w/ reported allergy to teriyaki. Discussed w/ nutrition  representative.  2. Encourage intake of meals.  3. Document intake of all PO as % taken in ADL's to provide interdisciplinary communication across all shifts.   4. Monitor weight.  5. Nutrition rep will continue to see patient for ongoing meal and snack preferences.     RD will monitor informally: pt ate well this a.m. and in ED last night per wife, enjoys food here per pt's wife.

## 2022-04-15 NOTE — ASSESSMENT & PLAN NOTE
"CT scan of the chest shows evidence for right worse than left extensive pulmonary abnormality compatible with subacute-chronic lung injury including bronchiectasis, bronchiolectasis, areas of volume loss and some groundglass that could indicate active infection versus hemorrhage or edema  Less likely pulmonary hemorrhage, as his Hemoglobin is higher than prior baseline, also patient has chills  This is likely worsening COVID-pneumonia versus superimposed bacterial  Procalcitonin not significantly elevated, D-dimer not elevated.  Worsening COVID-19 pneumonia.  No need for antibiotics  We will continue with steroids and Lasix\"    Follow up with [facility] and remote monitoring.  "

## 2022-04-15 NOTE — PROGRESS NOTES
ISOLATION PRECAUTIONS EDUCATION    Educated PATIENT, FAMILY, S.O: patient on isolation for C DIFFICILE and COVID-19.    Educated on reason for isolation, how the infection may be transmitted, and how to help prevent transmission to others. Educated precautions involves staff and visitors wearing PPE, following Standard Precautions and performing meticulous hand hygiene in order to prevent transmission of infection.     Contact Precautions: Educated that Contact Precautions involves staff and visitors wearing gowns and gloves when in the patient room.     In addition, educated that the patient may leave the room, but prior to exiting the patient room each time, the patient needs to have on a fresh patient gown, ensure the potentially infectious area is covered, and perform hand hygiene with soap and water, immediately prior to exiting the room.    Enhanced Droplet Precautions: Educated that Enhanced Droplet Precautions involves staff and visitors wearing a surgical mask when in the patient room.     In addition,  educated that they may leave their room, but prior to exiting the patient room each time, the patient needs to have on a fresh patient gown, a surgical mask must be worn by the patient while out of the patient room, and perform hand hygiene immediately prior to exiting the room.       Patient transport and mobilization on unit  Educated that they may leave their room, but prior to exiting, the patient needs to have on a fresh patient gown, ensure the potentially infectious area is covered, performing appropriate hand hygiene immediately prior to exiting the room.

## 2022-04-15 NOTE — PROGRESS NOTES
Telemetry Shift Summary     Rhythm: SR  Rate: 64-83  Measurements: 0.20/0.08/0.40  Ectopy (reported by Monitor Tech): r-o PVC     Normal Values  Rhythm: Sinus  HR:   Measurements: 0.12-0.20/0.06-0.10/0.30-0.52

## 2022-04-15 NOTE — INFECTION CONTROL
This patient is considered COVID RECOVERED.    Patient initially positive for COVID on 3/2022. Test collected 4/14/2022 has Ct value of 40.3; indicates previous infection and no longer contagious. Patient is greater than or equal to 10days from symptom onset and/or positive test.  Per the CDC guidance, this patient no longer requires transmission based precautions.  Patient may be placed on any unit per the bed assignment policy, including placement in a semi-private room with a roommate.

## 2022-04-15 NOTE — PROGRESS NOTES
4 Eyes Skin Assessment Completed by ERIK Villalba and ERIK Vincent.    Head Scab/abrasions   Ears WDL  Nose WDL  Mouth WDL  Neck WDL  Breast/Chest WDL  Shoulder Blades WDL  Spine WDL  (R) Arm/Elbow/Hand WDL  (L) Arm/Elbow/Hand WDL  Abdomen Bruising  Groin WDL  Scrotum/Coccyx/Buttocks WDL  (R) Leg WDL  (L) Leg WDL  (R) Heel/Foot/Toe WDL  (L) Heel/Foot/Toe Bruising (on toe)           Devices In Places Oxy Mask      Interventions In Place Pressure Redistribution Mattress    Possible Skin Injury No    Pictures Uploaded Into Epic N/A  Wound Consult Placed N/A  RN Wound Prevention Protocol Ordered No

## 2022-04-15 NOTE — ASSESSMENT & PLAN NOTE
"EKG shows a sinus rhythm with a rate of 79, there is no ST elevation, there is T wave inversions in lead V1-V6.  QTc 430.  Orthostatic vital  Telemetry monitor  I will check an echocardiography\"    EF 60%  "

## 2022-04-15 NOTE — PROGRESS NOTES
Pt arrived to unit; focused assessment completed; pt AOx4; VSS; 6L oxymask saturations high 90s; connected to tele monitor

## 2022-04-15 NOTE — PROGRESS NOTES
Telemetry Shift Summary     Rhythm: SR  HR Range:   Ectopy: rPVCs, bigem/trigem  Measurements: 0.20/0.08/0.34           Normal Values  Rhythm SR  HR Range    Measurements 0.12-0.20 / 0.06-0.10  / 0.30-0.52;e

## 2022-04-15 NOTE — ASSESSMENT & PLAN NOTE
This is Sepsis Present on admission  SIRS criteria identified on my evaluation include: Tachycardia, with heart rate greater than 90 BPM and Tachypnea, with respirations greater than 20 per minute  Source is pneumonia  Sepsis protocol initiated  Fluid resuscitation ordered per protocol  Crystalloid Fluid Administration: Fluid resuscitation ordered per standard protocol - 30 mL/kg per current or ideal body weight  Decadron started for COVID-pneumonia  Reassessment: I have reassessed the patient's hemodynamic status

## 2022-04-15 NOTE — HOSPITAL COURSE
"Per notes, \"72 y.o. male with a past medical history of chronic kidney disease stage III, gastroesophageal reflux disease who presented 4/14/2022 with an episode of syncope and worsening shortness of breath.  [facility] and was just discharged recently on 4 L of oxygen.  However patient continued to have worsening shortness of breath on the day of admission he had a syncopal episode without prior palpitations chest pain dizziness or lightheadedness.  In addition, his oxygen requirement increased, reportedly he was saturating 82% on 8 L of oxygen.  Patient denies noticing having any extremity pain redness or swelling. \"  "

## 2022-04-16 ENCOUNTER — APPOINTMENT (OUTPATIENT)
Dept: CARDIOLOGY | Facility: MEDICAL CENTER | Age: 72
DRG: 871 | End: 2022-04-16
Attending: INTERNAL MEDICINE
Payer: COMMERCIAL

## 2022-04-16 LAB
ANION GAP SERPL CALC-SCNC: 12 MMOL/L (ref 7–16)
BUN SERPL-MCNC: 29 MG/DL (ref 8–22)
CALCIUM SERPL-MCNC: 10.2 MG/DL (ref 8.4–10.2)
CHLORIDE SERPL-SCNC: 97 MMOL/L (ref 96–112)
CO2 SERPL-SCNC: 27 MMOL/L (ref 20–33)
CREAT SERPL-MCNC: 1.58 MG/DL (ref 0.5–1.4)
ERYTHROCYTE [DISTWIDTH] IN BLOOD BY AUTOMATED COUNT: 46.2 FL (ref 35.9–50)
GFR SERPLBLD CREATININE-BSD FMLA CKD-EPI: 46 ML/MIN/1.73 M 2
GLUCOSE BLD STRIP.AUTO-MCNC: 104 MG/DL (ref 65–99)
GLUCOSE BLD STRIP.AUTO-MCNC: 143 MG/DL (ref 65–99)
GLUCOSE BLD STRIP.AUTO-MCNC: 158 MG/DL (ref 65–99)
GLUCOSE BLD STRIP.AUTO-MCNC: 175 MG/DL (ref 65–99)
GLUCOSE SERPL-MCNC: 120 MG/DL (ref 65–99)
HCT VFR BLD AUTO: 43.1 % (ref 42–52)
HGB BLD-MCNC: 14.2 G/DL (ref 14–18)
LV EJECT FRACT  99904: 55
MAGNESIUM SERPL-MCNC: 2.3 MG/DL (ref 1.5–2.5)
MCH RBC QN AUTO: 31.3 PG (ref 27–33)
MCHC RBC AUTO-ENTMCNC: 32.9 G/DL (ref 33.7–35.3)
MCV RBC AUTO: 95.1 FL (ref 81.4–97.8)
PLATELET # BLD AUTO: 250 K/UL (ref 164–446)
PMV BLD AUTO: 9.9 FL (ref 9–12.9)
POTASSIUM SERPL-SCNC: 4.1 MMOL/L (ref 3.6–5.5)
RBC # BLD AUTO: 4.53 M/UL (ref 4.7–6.1)
SODIUM SERPL-SCNC: 136 MMOL/L (ref 135–145)
WBC # BLD AUTO: 9.7 K/UL (ref 4.8–10.8)

## 2022-04-16 PROCEDURE — 80048 BASIC METABOLIC PNL TOTAL CA: CPT

## 2022-04-16 PROCEDURE — 93306 TTE W/DOPPLER COMPLETE: CPT | Mod: 26 | Performed by: INTERNAL MEDICINE

## 2022-04-16 PROCEDURE — 700102 HCHG RX REV CODE 250 W/ 637 OVERRIDE(OP): Performed by: HOSPITALIST

## 2022-04-16 PROCEDURE — 99233 SBSQ HOSP IP/OBS HIGH 50: CPT | Performed by: INTERNAL MEDICINE

## 2022-04-16 PROCEDURE — 82962 GLUCOSE BLOOD TEST: CPT | Mod: 91

## 2022-04-16 PROCEDURE — 85027 COMPLETE CBC AUTOMATED: CPT

## 2022-04-16 PROCEDURE — 700111 HCHG RX REV CODE 636 W/ 250 OVERRIDE (IP): Performed by: INTERNAL MEDICINE

## 2022-04-16 PROCEDURE — 93306 TTE W/DOPPLER COMPLETE: CPT

## 2022-04-16 PROCEDURE — 700117 HCHG RX CONTRAST REV CODE 255: Performed by: INTERNAL MEDICINE

## 2022-04-16 PROCEDURE — 94640 AIRWAY INHALATION TREATMENT: CPT

## 2022-04-16 PROCEDURE — 770020 HCHG ROOM/CARE - TELE (206)

## 2022-04-16 PROCEDURE — 83735 ASSAY OF MAGNESIUM: CPT

## 2022-04-16 PROCEDURE — A9270 NON-COVERED ITEM OR SERVICE: HCPCS | Performed by: HOSPITALIST

## 2022-04-16 RX ADMIN — BUDESONIDE AND FORMOTEROL FUMARATE DIHYDRATE 2 PUFF: 80; 4.5 AEROSOL RESPIRATORY (INHALATION) at 06:31

## 2022-04-16 RX ADMIN — TRAMADOL HYDROCHLORIDE 50 MG: 50 TABLET, COATED ORAL at 19:40

## 2022-04-16 RX ADMIN — FUROSEMIDE 40 MG: 10 INJECTION, SOLUTION INTRAMUSCULAR; INTRAVENOUS at 17:25

## 2022-04-16 RX ADMIN — INSULIN HUMAN 1 UNITS: 100 INJECTION, SOLUTION PARENTERAL at 20:59

## 2022-04-16 RX ADMIN — DEXAMETHASONE 6 MG: 4 TABLET ORAL at 05:22

## 2022-04-16 RX ADMIN — RIVAROXABAN 10 MG: 10 TABLET, FILM COATED ORAL at 05:22

## 2022-04-16 RX ADMIN — FINASTERIDE 5 MG: 5 TABLET, FILM COATED ORAL at 17:25

## 2022-04-16 RX ADMIN — INSULIN HUMAN 1 UNITS: 100 INJECTION, SOLUTION PARENTERAL at 12:12

## 2022-04-16 RX ADMIN — BUPROPION HYDROCHLORIDE 150 MG: 150 TABLET, EXTENDED RELEASE ORAL at 05:22

## 2022-04-16 RX ADMIN — ALLOPURINOL 200 MG: 100 TABLET ORAL at 17:25

## 2022-04-16 RX ADMIN — TRAMADOL HYDROCHLORIDE 50 MG: 50 TABLET, COATED ORAL at 09:54

## 2022-04-16 RX ADMIN — FUROSEMIDE 40 MG: 10 INJECTION, SOLUTION INTRAMUSCULAR; INTRAVENOUS at 05:22

## 2022-04-16 RX ADMIN — BUDESONIDE AND FORMOTEROL FUMARATE DIHYDRATE 2 PUFF: 80; 4.5 AEROSOL RESPIRATORY (INHALATION) at 21:08

## 2022-04-16 RX ADMIN — TAMSULOSIN HYDROCHLORIDE 0.4 MG: 0.4 CAPSULE ORAL at 17:25

## 2022-04-16 RX ADMIN — OMEPRAZOLE 20 MG: 20 CAPSULE, DELAYED RELEASE ORAL at 05:22

## 2022-04-16 RX ADMIN — BENZONATATE 200 MG: 100 CAPSULE ORAL at 20:50

## 2022-04-16 RX ADMIN — BUPROPION HYDROCHLORIDE 150 MG: 150 TABLET, EXTENDED RELEASE ORAL at 17:25

## 2022-04-16 RX ADMIN — MONTELUKAST 10 MG: 10 TABLET, FILM COATED ORAL at 17:25

## 2022-04-16 RX ADMIN — HUMAN ALBUMIN MICROSPHERES AND PERFLUTREN 3 ML: 10; .22 INJECTION, SOLUTION INTRAVENOUS at 17:19

## 2022-04-16 ASSESSMENT — PAIN DESCRIPTION - PAIN TYPE
TYPE: ACUTE PAIN

## 2022-04-16 ASSESSMENT — ENCOUNTER SYMPTOMS
COUGH: 1
SHORTNESS OF BREATH: 1

## 2022-04-16 ASSESSMENT — FIBROSIS 4 INDEX: FIB4 SCORE: 1.18

## 2022-04-16 NOTE — PROGRESS NOTES
Telemetry Shift Summary     Rhythm: SR  Rate: 63-85  Measurements: 0.20/0.10/0.38  Ectopy (reported by Monitor Tech): o PVC, r Trig, r Coup, r Big      Normal Values  Rhythm: Sinus  HR:   Measurements: 0.12-0.20/0.06-0.10/0.30-0.52

## 2022-04-16 NOTE — PROGRESS NOTES
"Hospital Medicine Daily Progress Note    Date of Service  4/16/2022    Chief Complaint  Juan José Pate is a 72 y.o. male admitted 4/14/2022 with worsening shortness of breath    Hospital Course  Per notes, \"72 y.o. male with a past medical history of chronic kidney disease stage III, gastroesophageal reflux disease who presented 4/14/2022 with an episode of syncope and worsening shortness of breath.  [facility] and was just discharged recently on 4 L of oxygen.  However patient continued to have worsening shortness of breath on the day of admission he had a syncopal episode without prior palpitations chest pain dizziness or lightheadedness.  In addition, his oxygen requirement increased, reportedly he was saturating 82% on 8 L of oxygen.  Patient denies noticing having any extremity pain redness or swelling. \"      Interval Problem Update  4/16: Patient was initially diagnosed with Covid on March 12, has been in the hospital and on 4 L of oxygen since then.  Has acute worsening.  Walk test completed today and still requiring up to 10 L, not safe to discharge at this time.  Did have some crackles so we will diurese.     4/17: Patient improving, but still requiring up to 8 L of supplemental O2 when ambulating.  I do not feel he is safe for discharge at this time.  Can likely DC in 24 hours if continues to wean down on oxygen.  Already has oxygen tank at home, but prescription will need to be adjusted so we will repeat walk test in a.m.    I have personally seen and examined the patient at bedside. I discussed the plan of care with patient and bedside RN.    Consultants/Specialty  None    Code Status  Full Code    Disposition  Patient is not medically cleared for discharge.   Anticipate discharge to to home with close outpatient follow-up.  I have placed the appropriate orders for post-discharge needs.    Review of Systems  Review of Systems   Constitutional: Positive " for malaise/fatigue.   Respiratory: Positive for cough and shortness of breath.    All other systems reviewed and are negative.       Physical Exam  Temp:  [36.2 °C (97.2 °F)-36.9 °C (98.4 °F)] 36.9 °C (98.4 °F)  Pulse:  [] 84  Resp:  [18-20] 18  BP: (101-125)/(55-72) 125/69  SpO2:  [88 %-98 %] 95 %    Physical Exam  Vitals and nursing note reviewed.   Constitutional:       Appearance: Normal appearance.   Cardiovascular:      Rate and Rhythm: Normal rate and regular rhythm.      Pulses: Normal pulses.      Heart sounds: Normal heart sounds.   Pulmonary:      Effort: Respiratory distress present.      Breath sounds: Normal breath sounds.      Comments: Bilateral crackles  Abdominal:      General: Abdomen is flat. Bowel sounds are normal.      Palpations: Abdomen is soft.   Musculoskeletal:      Right lower leg: No edema.      Left lower leg: No edema.   Skin:     Findings: No lesion or rash.   Neurological:      General: No focal deficit present.      Mental Status: He is alert and oriented to person, place, and time. Mental status is at baseline.         Fluids    Intake/Output Summary (Last 24 hours) at 4/16/2022 1358  Last data filed at 4/16/2022 1215  Gross per 24 hour   Intake 600 ml   Output 1625 ml   Net -1025 ml       Laboratory  Recent Labs     04/14/22  1345 04/15/22  0115 04/16/22  0202   WBC 9.9 8.4 9.7   RBC 4.30* 4.26* 4.53*   HEMOGLOBIN 13.6* 13.4* 14.2   HEMATOCRIT 41.7* 41.2* 43.1   MCV 97.0 96.7 95.1   MCH 31.6 31.5 31.3   MCHC 32.6* 32.5* 32.9*   RDW 47.8 47.0 46.2   PLATELETCT 206 216 250   MPV 10.0 10.1 9.9     Recent Labs     04/14/22  1345 04/15/22  0115 04/16/22  0202   SODIUM 138 135 136   POTASSIUM 4.7 4.9 4.1   CHLORIDE 102 101 97   CO2 26 23 27   GLUCOSE 128* 133* 120*   BUN 21 20 29*   CREATININE 1.32 1.19 1.58*   CALCIUM 9.4 9.4 10.2                   Imaging  CT-CTA CHEST PULMONARY ARTERY W/ RECONS   Final Result      1.  Right worse than left extensive pulmonary abnormality  compatible with subacute-chronic lung injury including bronchiectasis, bronchiolectasis, areas of volume loss and some groundglass that could indicate active infection versus hemorrhage or edema      2.  Small right pleural effusion      3.  No CT evidence of pulmonary embolism      DX-CHEST-PORTABLE (1 VIEW)   Final Result      No significant interval change in bilateral pneumonia.      EC-ECHOCARDIOGRAM COMPLETE W/O CONT    (Results Pending)        Assessment/Plan  * Syncope and collapse- (present on admission)  Assessment & Plan  EKG shows a sinus rhythm with a rate of 79, there is no ST elevation, there is T wave inversions in lead V1-V6.  QTc 430.  Orthostatic vital  Telemetry monitor  I will check an echocardiography    Pneumonia due to COVID-19 virus- (present on admission)  Assessment & Plan  CT scan of the chest shows evidence for right worse than left extensive pulmonary abnormality compatible with subacute-chronic lung injury including bronchiectasis, bronchiolectasis, areas of volume loss and some groundglass that could indicate active infection versus hemorrhage or edema  Less likely pulmonary hemorrhage, as his Hemoglobin is higher than prior baseline, also patient has chills  This is likely worsening COVID-pneumonia versus superimposed bacterial  Procalcitonin not significantly elevated, D-dimer not elevated.  Worsening COVID-19 pneumonia.  No need for antibiotics  We will continue with steroids and Lasix    Sepsis (HCC)- (present on admission)  Assessment & Plan  This is Sepsis Present on admission  SIRS criteria identified on my evaluation include: Tachycardia, with heart rate greater than 90 BPM and Tachypnea, with respirations greater than 20 per minute  Source is pneumonia  Sepsis protocol initiated  Fluid resuscitation ordered per protocol  Crystalloid Fluid Administration: Fluid resuscitation ordered per standard protocol - 30 mL/kg per current or ideal body weight  Decadron started for  COVID-pneumonia  Reassessment: I have reassessed the patient's hemodynamic status    Gastroesophageal reflux disease without esophagitis- (present on admission)  Assessment & Plan  Resume home pantoprazole    Stage 3a chronic kidney disease (HCC)- (present on admission)  Assessment & Plan  Avoid/minimize nephrotoxins as much as possible, renally dose medications, monitor inputs and outputs     Acute on chronic respiratory failure with hypoxia (HCC)- (present on admission)  Assessment & Plan  Likely due from worsening COVID-19 pneumonia.  Discussed with infection control, is considered an old infection, and any precautions.  Pulmonary apparently consulted on admission, follow recommendations  Oxygen as needed, Respiratory protocol, Bronchodilators, Incentive spirometry  Continue with Lasix 40 mg IV twice daily, continue to monitor necessity daily  Has been able to wean a little, but still requiring 8L supplemental O2 with ambulating.  Repeat walk test in a.m.    Diarrhea- (present on admission)  Assessment & Plan  Differentials include related to his COVID infection, other viral infections, C. difficile colitis [has been on antibiotics]  I will check stool for C. difficile toxins.  Supportive care with intravenous fluids monitor electrolytes and replace accordingly   No episodes since patient has been in the hospital    Syncope- (present on admission)  Assessment & Plan  EKG shows a sinus rhythm with a rate of 79, there is no ST elevation, there is T wave inversions in lead V1-V6.  QTc 430.  Orthostatic vital  Telemetry monitor  Follow echocardiogram results-apparently there is an order issue on admission so have reordered on 4/16, pending results       VTE prophylaxis: SCDs/TEDs    I have performed a physical exam and reviewed and updated ROS and Plan today (4/16/2022). In review of yesterday's note (4/15/2022), there are no changes except as documented above.

## 2022-04-16 NOTE — CARE PLAN
The patient is Stable - Low risk of patient condition declining or worsening    Shift Goals  Clinical Goals: DC with oxygen  Patient Goals: rest    Progress made toward(s) clinical / shift goals:  Patient on 4 L. Diuresing with lasix. Patient medicated for right rib pain.     Problem: Knowledge Deficit - Standard  Goal: Patient and family/care givers will demonstrate understanding of plan of care, disease process/condition, diagnostic tests and medications  Outcome: Progressing     Problem: Hemodynamics  Goal: Patient's hemodynamics, fluid balance and neurologic status will be stable or improve  Outcome: Progressing     Problem: Fluid Volume  Goal: Fluid volume balance will be maintained  Outcome: Progressing     Problem: Pain - Standard  Goal: Alleviation of pain or a reduction in pain to the patient’s comfort goal  Outcome: Progressing       Patient is not progressing towards the following goals:

## 2022-04-17 ENCOUNTER — TELEPHONE (OUTPATIENT)
Dept: OTHER | Facility: MEDICAL CENTER | Age: 72
End: 2022-04-17
Payer: COMMERCIAL

## 2022-04-17 VITALS — OXYGEN SATURATION: 94 % | HEART RATE: 84 BPM | RESPIRATION RATE: 13 BRPM

## 2022-04-17 LAB
BACTERIA UR CULT: NORMAL
GLUCOSE BLD STRIP.AUTO-MCNC: 137 MG/DL (ref 65–99)
GLUCOSE BLD STRIP.AUTO-MCNC: 145 MG/DL (ref 65–99)
GLUCOSE BLD STRIP.AUTO-MCNC: 185 MG/DL (ref 65–99)
GLUCOSE BLD STRIP.AUTO-MCNC: 189 MG/DL (ref 65–99)
SIGNIFICANT IND 70042: NORMAL
SITE SITE: NORMAL
SOURCE SOURCE: NORMAL

## 2022-04-17 PROCEDURE — 99454 REM MNTR PHYSIOL PARAM 16-30: CPT

## 2022-04-17 PROCEDURE — 700111 HCHG RX REV CODE 636 W/ 250 OVERRIDE (IP): Performed by: INTERNAL MEDICINE

## 2022-04-17 PROCEDURE — 770020 HCHG ROOM/CARE - TELE (206)

## 2022-04-17 PROCEDURE — 99233 SBSQ HOSP IP/OBS HIGH 50: CPT | Performed by: INTERNAL MEDICINE

## 2022-04-17 PROCEDURE — A9270 NON-COVERED ITEM OR SERVICE: HCPCS | Performed by: HOSPITALIST

## 2022-04-17 PROCEDURE — 700111 HCHG RX REV CODE 636 W/ 250 OVERRIDE (IP): Performed by: HOSPITALIST

## 2022-04-17 PROCEDURE — 99453 REM MNTR PHYSIOL PARAM SETUP: CPT

## 2022-04-17 PROCEDURE — 700102 HCHG RX REV CODE 250 W/ 637 OVERRIDE(OP): Performed by: HOSPITALIST

## 2022-04-17 PROCEDURE — 82962 GLUCOSE BLOOD TEST: CPT | Mod: 91

## 2022-04-17 PROCEDURE — 94640 AIRWAY INHALATION TREATMENT: CPT

## 2022-04-17 PROCEDURE — 94760 N-INVAS EAR/PLS OXIMETRY 1: CPT

## 2022-04-17 RX ADMIN — OMEPRAZOLE 20 MG: 20 CAPSULE, DELAYED RELEASE ORAL at 05:20

## 2022-04-17 RX ADMIN — DEXAMETHASONE 6 MG: 4 TABLET ORAL at 05:20

## 2022-04-17 RX ADMIN — BUDESONIDE AND FORMOTEROL FUMARATE DIHYDRATE 2 PUFF: 80; 4.5 AEROSOL RESPIRATORY (INHALATION) at 07:06

## 2022-04-17 RX ADMIN — MONTELUKAST 10 MG: 10 TABLET, FILM COATED ORAL at 17:55

## 2022-04-17 RX ADMIN — BUDESONIDE AND FORMOTEROL FUMARATE DIHYDRATE 2 PUFF: 80; 4.5 AEROSOL RESPIRATORY (INHALATION) at 20:21

## 2022-04-17 RX ADMIN — BUPROPION HYDROCHLORIDE 150 MG: 150 TABLET, EXTENDED RELEASE ORAL at 17:55

## 2022-04-17 RX ADMIN — RIVAROXABAN 10 MG: 10 TABLET, FILM COATED ORAL at 05:20

## 2022-04-17 RX ADMIN — FUROSEMIDE 40 MG: 10 INJECTION, SOLUTION INTRAMUSCULAR; INTRAVENOUS at 16:55

## 2022-04-17 RX ADMIN — ONDANSETRON 4 MG: 4 TABLET, ORALLY DISINTEGRATING ORAL at 08:20

## 2022-04-17 RX ADMIN — FINASTERIDE 5 MG: 5 TABLET, FILM COATED ORAL at 17:55

## 2022-04-17 RX ADMIN — ALLOPURINOL 200 MG: 100 TABLET ORAL at 17:55

## 2022-04-17 RX ADMIN — BUPROPION HYDROCHLORIDE 150 MG: 150 TABLET, EXTENDED RELEASE ORAL at 05:20

## 2022-04-17 RX ADMIN — TAMSULOSIN HYDROCHLORIDE 0.4 MG: 0.4 CAPSULE ORAL at 17:55

## 2022-04-17 RX ADMIN — FUROSEMIDE 40 MG: 10 INJECTION, SOLUTION INTRAMUSCULAR; INTRAVENOUS at 05:20

## 2022-04-17 RX ADMIN — TRAMADOL HYDROCHLORIDE 50 MG: 50 TABLET, COATED ORAL at 17:56

## 2022-04-17 ASSESSMENT — ENCOUNTER SYMPTOMS
SHORTNESS OF BREATH: 1
COUGH: 1

## 2022-04-17 ASSESSMENT — PAIN DESCRIPTION - PAIN TYPE
TYPE: ACUTE PAIN
TYPE: ACUTE PAIN;CHRONIC PAIN

## 2022-04-17 NOTE — PROGRESS NOTES
"Hospital Medicine Daily Progress Note    Date of Service  4/17/2022    Chief Complaint  Juan José Pate is a 72 y.o. male admitted 4/14/2022 with worsening shortness of breath    Hospital Course  Per notes, \"72 y.o. male with a past medical history of chronic kidney disease stage III, gastroesophageal reflux disease who presented 4/14/2022 with an episode of syncope and worsening shortness of breath.  [facility] and was just discharged recently on 4 L of oxygen.  However patient continued to have worsening shortness of breath on the day of admission he had a syncopal episode without prior palpitations chest pain dizziness or lightheadedness.  In addition, his oxygen requirement increased, reportedly he was saturating 82% on 8 L of oxygen.  Patient denies noticing having any extremity pain redness or swelling. \"      Interval Problem Update  4/15: Patient was initially diagnosed with Covid on March 12, has been in the hospital and on 4 L of oxygen since then.  Has acute worsening.  Walk test completed today and still requiring up to 10 L, not safe to discharge at this time.  Did have some crackles so we will diurese.     4/16: Patient improving, but still requiring up to 8 L of supplemental O2 when ambulating.  I do not feel he is safe for discharge at this time.  Can likely DC in 24 hours if continues to wean down on oxygen.  Already has oxygen tank at home, but prescription will need to be adjusted so we will repeat walk test in a.m.     4/17: Patient still using 8 L supplemental O2 when ambulating and desaturating to 85%.  Discussed with patient's wife and patient, given his previously failed outpatient therapy and readmission to the hospital I think he needs more time and to wean down off of oxygen.  Have arranged for home monitoring as well.    I have personally seen and examined the patient at bedside. I discussed the plan of care with patient and bedside " RN.    Consultants/Specialty  None    Code Status  Full Code    Disposition  Patient is not medically cleared for discharge.   Anticipate discharge to to home with close outpatient follow-up.  I have placed the appropriate orders for post-discharge needs.    Review of Systems  Review of Systems   Constitutional: Positive for malaise/fatigue.   Respiratory: Positive for cough and shortness of breath.    All other systems reviewed and are negative.       Physical Exam  Temp:  [36.3 °C (97.3 °F)-36.7 °C (98.1 °F)] 36.3 °C (97.3 °F)  Pulse:  [] 87  Resp:  [18-24] 22  BP: (106-129)/(64-76) 118/74  SpO2:  [90 %-97 %] 92 %    Physical Exam  Vitals and nursing note reviewed.   Constitutional:       Appearance: Normal appearance.   Cardiovascular:      Rate and Rhythm: Normal rate and regular rhythm.      Pulses: Normal pulses.      Heart sounds: Normal heart sounds.   Pulmonary:      Effort: Respiratory distress present.      Breath sounds: Normal breath sounds.      Comments: Bilateral crackles  Abdominal:      General: Abdomen is flat. Bowel sounds are normal.      Palpations: Abdomen is soft.   Musculoskeletal:      Right lower leg: No edema.      Left lower leg: No edema.   Skin:     Findings: No lesion or rash.   Neurological:      General: No focal deficit present.      Mental Status: He is alert and oriented to person, place, and time. Mental status is at baseline.         Fluids    Intake/Output Summary (Last 24 hours) at 4/17/2022 1431  Last data filed at 4/17/2022 1100  Gross per 24 hour   Intake 300 ml   Output 1700 ml   Net -1400 ml       Laboratory  Recent Labs     04/15/22  0115 04/16/22  0202   WBC 8.4 9.7   RBC 4.26* 4.53*   HEMOGLOBIN 13.4* 14.2   HEMATOCRIT 41.2* 43.1   MCV 96.7 95.1   MCH 31.5 31.3   MCHC 32.5* 32.9*   RDW 47.0 46.2   PLATELETCT 216 250   MPV 10.1 9.9     Recent Labs     04/15/22  0115 04/16/22 0202   SODIUM 135 136   POTASSIUM 4.9 4.1   CHLORIDE 101 97   CO2 23 27   GLUCOSE 133*  120*   BUN 20 29*   CREATININE 1.19 1.58*   CALCIUM 9.4 10.2                   Imaging  EC-ECHOCARDIOGRAM COMPLETE W/ CONT   Final Result      CT-CTA CHEST PULMONARY ARTERY W/ RECONS   Final Result      1.  Right worse than left extensive pulmonary abnormality compatible with subacute-chronic lung injury including bronchiectasis, bronchiolectasis, areas of volume loss and some groundglass that could indicate active infection versus hemorrhage or edema      2.  Small right pleural effusion      3.  No CT evidence of pulmonary embolism      DX-CHEST-PORTABLE (1 VIEW)   Final Result      No significant interval change in bilateral pneumonia.           Assessment/Plan  * Syncope and collapse- (present on admission)  Assessment & Plan  EKG shows a sinus rhythm with a rate of 79, there is no ST elevation, there is T wave inversions in lead V1-V6.  QTc 430.  Orthostatic vital  Telemetry monitor  I will check an echocardiography    Pneumonia due to COVID-19 virus- (present on admission)  Assessment & Plan  CT scan of the chest shows evidence for right worse than left extensive pulmonary abnormality compatible with subacute-chronic lung injury including bronchiectasis, bronchiolectasis, areas of volume loss and some groundglass that could indicate active infection versus hemorrhage or edema  Less likely pulmonary hemorrhage, as his Hemoglobin is higher than prior baseline, also patient has chills  This is likely worsening COVID-pneumonia versus superimposed bacterial  Procalcitonin not significantly elevated, D-dimer not elevated.  Worsening COVID-19 pneumonia.  No need for antibiotics  We will continue with steroids and Lasix    Sepsis (HCC)- (present on admission)  Assessment & Plan  This is Sepsis Present on admission  SIRS criteria identified on my evaluation include: Tachycardia, with heart rate greater than 90 BPM and Tachypnea, with respirations greater than 20 per minute  Source is pneumonia  Sepsis protocol  initiated  Fluid resuscitation ordered per protocol  Crystalloid Fluid Administration: Fluid resuscitation ordered per standard protocol - 30 mL/kg per current or ideal body weight  Decadron started for COVID-pneumonia  Reassessment: I have reassessed the patient's hemodynamic status    Gastroesophageal reflux disease without esophagitis- (present on admission)  Assessment & Plan  Resume home pantoprazole    Stage 3a chronic kidney disease (HCC)- (present on admission)  Assessment & Plan  Avoid/minimize nephrotoxins as much as possible, renally dose medications, monitor inputs and outputs     Acute on chronic respiratory failure with hypoxia (HCC)- (present on admission)  Assessment & Plan  Likely due from worsening COVID-19 pneumonia.  Discussed with infection control, is considered an old infection, and any precautions.  Pulmonary apparently consulted on admission, follow recommendations  Oxygen as needed, Respiratory protocol, Bronchodilators, Incentive spirometry  Continue with Lasix 40 mg IV twice daily, continue to monitor necessity daily  Has been able to wean a little, but still requiring 8L supplemental O2 with ambulating desaturating to 85%  Repeat walk test in a.m.    Diarrhea- (present on admission)  Assessment & Plan  Differentials include related to his COVID infection, other viral infections, C. difficile colitis [has been on antibiotics]  I will check stool for C. difficile toxins.  Supportive care with intravenous fluids monitor electrolytes and replace accordingly   No episodes since patient has been in the hospital    Syncope- (present on admission)  Assessment & Plan  EKG shows a sinus rhythm with a rate of 79, there is no ST elevation, there is T wave inversions in lead V1-V6.  QTc 430.  Orthostatic vital  Telemetry monitor  Echocardiogram showed normal wall motion, EF of 55%.       VTE prophylaxis: SCDs/TEDs    I have performed a physical exam and reviewed and updated ROS and Plan today  (4/17/2022). In review of yesterday's note (4/16/2022), there are no changes except as documented above.

## 2022-04-17 NOTE — PROGRESS NOTES
Telemetry Shift Summary     Rhythm: SR  HR Range:  (increased with ambulation)  Ectopy: fPVC/PAC/trigem/bigem  Measurements: 0.20/0.08/0.36           Normal Values  Rhythm SR  HR Range    Measurements 0.12-0.20 / 0.06-0.10  / 0.30-0.52

## 2022-04-17 NOTE — CARE PLAN
The patient is Watcher - Medium risk of patient condition declining or worsening    Shift Goals  Clinical Goals: Wean O2, diuerese  Patient Goals: Breathe better    Progress made toward(s) clinical / shift goals:    Problem: Knowledge Deficit - Standard  Goal: Patient and family/care givers will demonstrate understanding of plan of care, disease process/condition, diagnostic tests and medications  Outcome: Progressing     Problem: Fluid Volume  Goal: Fluid volume balance will be maintained  Outcome: Progressing       Patient is not progressing towards the following goals:      Problem: Respiratory  Goal: Patient will achieve/maintain optimum respiratory ventilation and gas exchange  Outcome: Not Progressing   Pt still requiring 6 to 8 L with ambulation. Encouraged to use incentive spirometer and to practice pursed lip breathing.

## 2022-04-17 NOTE — RESPIRATORY CARE
REMOTE MONITORING PROGRAM by RESPIRATORY THERAPY  2022 at 12:53 PM by Monica Macdonald, Care Aide             Patient's name:  Juan José Pate        MRN:[id number]     : 1950  Physical address: [address] [place], [place] [address] [address]  Cell phone #[phone]  Current oxygen requirement 4LPM  Ref # [id number]& Lot #[id number]  Emergency contact name & number: Анна Pate [phone]  Primary language English  Covid         Patient agrees to Remote Monitoring program. Device instruction performed with welcome packet, consent signed and placed at bedside chart. Patient instructed on how to use MyChart and Zoom. No further questions at this time.

## 2022-04-17 NOTE — CARE PLAN
The patient is Stable - Low risk of patient condition declining or worsening    Shift Goals  Clinical Goals: wean o2, walk test  Patient Goals: walk longer without SOB    Progress made toward(s) clinical / shift goals:      Patient is not progressing towards the following goals: Patient same o2 as yesterday with minimal improvement with distance walked.      Problem: Respiratory  Goal: Patient will achieve/maintain optimum respiratory ventilation and gas exchange  Outcome: Not Progressing  Flowsheets (Taken 4/17/2022 1536 by Siola Moon, C.N.A.)  O2 Delivery Device: Nasal Cannula

## 2022-04-17 NOTE — TELEPHONE ENCOUNTER
RPM Notification: Welcome Call  Actions: None    Total time (minutes) spent communicating with patient: 3    Patient is a new start to the program.

## 2022-04-17 NOTE — PROGRESS NOTES
Received report from ERIK Anderson. Safety precautions in place. Bed locked and low. Offered assist. Call light and belongings within reach.

## 2022-04-17 NOTE — PROGRESS NOTES
Telemetry Shift Summary     Rhythm SR  HR Range 69-95  Ectopy oPAC/trigem/couplet;fPVC  Measurements 0.20/0.08/0.40           Normal Values  Rhythm SR  HR Range    Measurements 0.12-0.20 / 0.06-0.10  / 0.30-0.52

## 2022-04-18 ENCOUNTER — APPOINTMENT (OUTPATIENT)
Dept: RADIOLOGY | Facility: MEDICAL CENTER | Age: 72
DRG: 871 | End: 2022-04-18
Attending: INTERNAL MEDICINE
Payer: COMMERCIAL

## 2022-04-18 ENCOUNTER — TELEPHONE (OUTPATIENT)
Dept: OTHER | Facility: MEDICAL CENTER | Age: 72
End: 2022-04-18
Payer: COMMERCIAL

## 2022-04-18 VITALS — HEART RATE: 107 BPM | RESPIRATION RATE: 22 BRPM | OXYGEN SATURATION: 89 %

## 2022-04-18 LAB
ANION GAP SERPL CALC-SCNC: 17 MMOL/L (ref 7–16)
BASOPHILS # BLD AUTO: 0.6 % (ref 0–1.8)
BASOPHILS # BLD: 0.07 K/UL (ref 0–0.12)
BUN SERPL-MCNC: 44 MG/DL (ref 8–22)
CALCIUM SERPL-MCNC: 10.5 MG/DL (ref 8.4–10.2)
CHLORIDE SERPL-SCNC: 93 MMOL/L (ref 96–112)
CO2 SERPL-SCNC: 26 MMOL/L (ref 20–33)
CORTIS SERPL-MCNC: 28.9 UG/DL (ref 0–23)
CREAT SERPL-MCNC: 1.77 MG/DL (ref 0.5–1.4)
D DIMER PPP IA.FEU-MCNC: 1.4 UG/ML (FEU) (ref 0–0.5)
EOSINOPHIL # BLD AUTO: 0.09 K/UL (ref 0–0.51)
EOSINOPHIL NFR BLD: 0.8 % (ref 0–6.9)
ERYTHROCYTE [DISTWIDTH] IN BLOOD BY AUTOMATED COUNT: 45.8 FL (ref 35.9–50)
GFR SERPLBLD CREATININE-BSD FMLA CKD-EPI: 40 ML/MIN/1.73 M 2
GLUCOSE BLD STRIP.AUTO-MCNC: 129 MG/DL (ref 65–99)
GLUCOSE BLD STRIP.AUTO-MCNC: 138 MG/DL (ref 65–99)
GLUCOSE BLD STRIP.AUTO-MCNC: 149 MG/DL (ref 65–99)
GLUCOSE BLD STRIP.AUTO-MCNC: 166 MG/DL (ref 65–99)
GLUCOSE BLD STRIP.AUTO-MCNC: 238 MG/DL (ref 65–99)
GLUCOSE SERPL-MCNC: 192 MG/DL (ref 65–99)
HCT VFR BLD AUTO: 50.8 % (ref 42–52)
HGB BLD-MCNC: 16.8 G/DL (ref 14–18)
IMM GRANULOCYTES # BLD AUTO: 0.13 K/UL (ref 0–0.11)
IMM GRANULOCYTES NFR BLD AUTO: 1.1 % (ref 0–0.9)
LYMPHOCYTES # BLD AUTO: 1.54 K/UL (ref 1–4.8)
LYMPHOCYTES NFR BLD: 12.9 % (ref 22–41)
MCH RBC QN AUTO: 31.5 PG (ref 27–33)
MCHC RBC AUTO-ENTMCNC: 33.1 G/DL (ref 33.7–35.3)
MCV RBC AUTO: 95.3 FL (ref 81.4–97.8)
MONOCYTES # BLD AUTO: 1.23 K/UL (ref 0–0.85)
MONOCYTES NFR BLD AUTO: 10.3 % (ref 0–13.4)
NEUTROPHILS # BLD AUTO: 8.86 K/UL (ref 1.82–7.42)
NEUTROPHILS NFR BLD: 74.3 % (ref 44–72)
NRBC # BLD AUTO: 0 K/UL
NRBC BLD-RTO: 0 /100 WBC
PLATELET # BLD AUTO: 258 K/UL (ref 164–446)
PMV BLD AUTO: 10.7 FL (ref 9–12.9)
POTASSIUM SERPL-SCNC: 4.1 MMOL/L (ref 3.6–5.5)
RBC # BLD AUTO: 5.33 M/UL (ref 4.7–6.1)
SODIUM SERPL-SCNC: 136 MMOL/L (ref 135–145)
WBC # BLD AUTO: 11.9 K/UL (ref 4.8–10.8)

## 2022-04-18 PROCEDURE — 82533 TOTAL CORTISOL: CPT

## 2022-04-18 PROCEDURE — 700105 HCHG RX REV CODE 258: Performed by: INTERNAL MEDICINE

## 2022-04-18 PROCEDURE — 94640 AIRWAY INHALATION TREATMENT: CPT

## 2022-04-18 PROCEDURE — 71045 X-RAY EXAM CHEST 1 VIEW: CPT

## 2022-04-18 PROCEDURE — 80048 BASIC METABOLIC PNL TOTAL CA: CPT

## 2022-04-18 PROCEDURE — 85379 FIBRIN DEGRADATION QUANT: CPT

## 2022-04-18 PROCEDURE — 82962 GLUCOSE BLOOD TEST: CPT | Mod: 91

## 2022-04-18 PROCEDURE — 700102 HCHG RX REV CODE 250 W/ 637 OVERRIDE(OP): Performed by: HOSPITALIST

## 2022-04-18 PROCEDURE — A9270 NON-COVERED ITEM OR SERVICE: HCPCS | Performed by: INTERNAL MEDICINE

## 2022-04-18 PROCEDURE — 99233 SBSQ HOSP IP/OBS HIGH 50: CPT | Performed by: INTERNAL MEDICINE

## 2022-04-18 PROCEDURE — 700102 HCHG RX REV CODE 250 W/ 637 OVERRIDE(OP): Performed by: INTERNAL MEDICINE

## 2022-04-18 PROCEDURE — 700111 HCHG RX REV CODE 636 W/ 250 OVERRIDE (IP): Performed by: INTERNAL MEDICINE

## 2022-04-18 PROCEDURE — A9270 NON-COVERED ITEM OR SERVICE: HCPCS | Performed by: HOSPITALIST

## 2022-04-18 PROCEDURE — 94760 N-INVAS EAR/PLS OXIMETRY 1: CPT

## 2022-04-18 PROCEDURE — 85025 COMPLETE CBC W/AUTO DIFF WBC: CPT

## 2022-04-18 PROCEDURE — 770020 HCHG ROOM/CARE - TELE (206)

## 2022-04-18 RX ORDER — BISACODYL 10 MG
10 SUPPOSITORY, RECTAL RECTAL DAILY
Status: DISCONTINUED | OUTPATIENT
Start: 2022-04-18 | End: 2022-04-20 | Stop reason: HOSPADM

## 2022-04-18 RX ORDER — SODIUM CHLORIDE 9 MG/ML
500 INJECTION, SOLUTION INTRAVENOUS ONCE
Status: COMPLETED | OUTPATIENT
Start: 2022-04-18 | End: 2022-04-18

## 2022-04-18 RX ORDER — AMOXICILLIN 250 MG
2 CAPSULE ORAL 2 TIMES DAILY
Status: DISCONTINUED | OUTPATIENT
Start: 2022-04-18 | End: 2022-04-20 | Stop reason: HOSPADM

## 2022-04-18 RX ORDER — POLYETHYLENE GLYCOL 3350 17 G/17G
1 POWDER, FOR SOLUTION ORAL
Status: DISCONTINUED | OUTPATIENT
Start: 2022-04-18 | End: 2022-04-20 | Stop reason: HOSPADM

## 2022-04-18 RX ADMIN — BUPROPION HYDROCHLORIDE 150 MG: 150 TABLET, EXTENDED RELEASE ORAL at 18:07

## 2022-04-18 RX ADMIN — RIVAROXABAN 10 MG: 10 TABLET, FILM COATED ORAL at 06:15

## 2022-04-18 RX ADMIN — ALLOPURINOL 200 MG: 100 TABLET ORAL at 18:07

## 2022-04-18 RX ADMIN — BUDESONIDE AND FORMOTEROL FUMARATE DIHYDRATE 2 PUFF: 80; 4.5 AEROSOL RESPIRATORY (INHALATION) at 06:50

## 2022-04-18 RX ADMIN — SODIUM CHLORIDE 500 ML: 9 INJECTION, SOLUTION INTRAVENOUS at 08:40

## 2022-04-18 RX ADMIN — FUROSEMIDE 40 MG: 10 INJECTION, SOLUTION INTRAMUSCULAR; INTRAVENOUS at 06:14

## 2022-04-18 RX ADMIN — FINASTERIDE 5 MG: 5 TABLET, FILM COATED ORAL at 18:07

## 2022-04-18 RX ADMIN — SENNOSIDES AND DOCUSATE SODIUM 2 TABLET: 50; 8.6 TABLET ORAL at 12:00

## 2022-04-18 RX ADMIN — INSULIN HUMAN 1 UNITS: 100 INJECTION, SOLUTION PARENTERAL at 12:02

## 2022-04-18 RX ADMIN — MONTELUKAST 10 MG: 10 TABLET, FILM COATED ORAL at 18:07

## 2022-04-18 RX ADMIN — BUPROPION HYDROCHLORIDE 150 MG: 150 TABLET, EXTENDED RELEASE ORAL at 06:14

## 2022-04-18 RX ADMIN — BUDESONIDE AND FORMOTEROL FUMARATE DIHYDRATE 2 PUFF: 80; 4.5 AEROSOL RESPIRATORY (INHALATION) at 20:33

## 2022-04-18 RX ADMIN — OMEPRAZOLE 20 MG: 20 CAPSULE, DELAYED RELEASE ORAL at 06:14

## 2022-04-18 RX ADMIN — DEXAMETHASONE 6 MG: 4 TABLET ORAL at 06:14

## 2022-04-18 ASSESSMENT — ENCOUNTER SYMPTOMS
COUGH: 1
SHORTNESS OF BREATH: 1

## 2022-04-18 ASSESSMENT — PAIN DESCRIPTION - PAIN TYPE: TYPE: ACUTE PAIN;CHRONIC PAIN

## 2022-04-18 NOTE — PROGRESS NOTES
A rapid response was called for this pt. Pt ambulated to bathroom with ERIK Antoine. Pt became unresponsive for a few seconds. Vitals were assessed (see Flowsheet). O2 saturation was in the 70s. MD Choi was notified. New orders were placed.   Once pt was stable he was moved in a chair back to bed. Pt went from 8L to 15L oxy-mask. Will continue to monitor.

## 2022-04-18 NOTE — PROGRESS NOTES
Monitor Summary     Rhythm:SR 85-93  Measurements: 0.16/0.08/0.38  ECTOPIES: fPVC, rPAC, rCOUP, rTRIG, rBIG        Normal Values  Rhythm SR  HR Range    Measurements 0.12-0.20 / 0.06-0.10  / 0.30-0.52

## 2022-04-18 NOTE — PROGRESS NOTES
"Hospital Medicine Daily Progress Note    Date of Service  4/18/2022    Chief Complaint  Juan José Pate is a 72 y.o. male admitted 4/14/2022 with worsening shortness of breath    Hospital Course  Per notes, \"72 y.o. male with a past medical history of chronic kidney disease stage III, gastroesophageal reflux disease who presented 4/14/2022 with an episode of syncope and worsening shortness of breath.  [facility] and was just discharged recently on 4 L of oxygen.  However patient continued to have worsening shortness of breath on the day of admission he had a syncopal episode without prior palpitations chest pain dizziness or lightheadedness.  In addition, his oxygen requirement increased, reportedly he was saturating 82% on 8 L of oxygen.  Patient denies noticing having any extremity pain redness or swelling. \"      Interval Problem Update  4/15: Patient was initially diagnosed with Covid on March 12, has been in the hospital and on 4 L of oxygen since then.  Has acute worsening.  Walk test completed today and still requiring up to 10 L, not safe to discharge at this time.  Did have some crackles so we will diurese.     4/16: Patient improving, but still requiring up to 8 L of supplemental O2 when ambulating.  I do not feel he is safe for discharge at this time.  Can likely DC in 24 hours if continues to wean down on oxygen.  Already has oxygen tank at home, but prescription will need to be adjusted so we will repeat walk test in a.m.     4/17: Patient still using 8 L supplemental O2 when ambulating and desaturating to 85%.  Discussed with patient's wife and patient, given his previously failed outpatient therapy and readmission to the hospital I think he needs more time and to wean down off of oxygen.  Have arranged for home monitoring as well.    4/18: Patient had weaned down to 4 L of supplemental O2.  However he had a rapid response was called on the morning of " 4/18 after patient had a pre-syncopal episode while in the bathroom.  It is likely that he had a vasovagal response.  However afterwards he was requiring 15 L supplemental O2.  Stat labs, EKG, x-ray, blood sugar and vital signs are all within normal limits.     I have personally seen and examined the patient at bedside. I discussed the plan of care with patient and bedside RN.    Consultants/Specialty  None    Code Status  Full Code    Disposition  Patient is not medically cleared for discharge.   Anticipate discharge to to home with close outpatient follow-up.  I have placed the appropriate orders for post-discharge needs.    Review of Systems  Review of Systems   Constitutional: Positive for malaise/fatigue.   Respiratory: Positive for cough and shortness of breath.    All other systems reviewed and are negative.       Physical Exam  Pulse:  [] 95  Resp:  [20-26] 22  BP: ()/(58-91) 127/76  SpO2:  [90 %-98 %] 98 %    Physical Exam  Vitals and nursing note reviewed.   Constitutional:       Appearance: Normal appearance.   Cardiovascular:      Rate and Rhythm: Normal rate and regular rhythm.      Pulses: Normal pulses.      Heart sounds: Normal heart sounds.   Pulmonary:      Effort: Respiratory distress present.      Breath sounds: Normal breath sounds.      Comments: Bilateral crackles  Abdominal:      General: Abdomen is flat. Bowel sounds are normal.      Palpations: Abdomen is soft.   Musculoskeletal:      Right lower leg: No edema.      Left lower leg: No edema.   Skin:     Findings: No lesion or rash.   Neurological:      General: No focal deficit present.      Mental Status: He is alert and oriented to person, place, and time. Mental status is at baseline.         Fluids    Intake/Output Summary (Last 24 hours) at 4/18/2022 1615  Last data filed at 4/17/2022 1930  Gross per 24 hour   Intake 510 ml   Output 650 ml   Net -140 ml       Laboratory  Recent Labs     04/16/22  0202 04/18/22  0820   WBC  9.7 11.9*   RBC 4.53* 5.33   HEMOGLOBIN 14.2 16.8   HEMATOCRIT 43.1 50.8   MCV 95.1 95.3   MCH 31.3 31.5   MCHC 32.9* 33.1*   RDW 46.2 45.8   PLATELETCT 250 258   MPV 9.9 10.7     Recent Labs     04/16/22  0202 04/18/22  0820   SODIUM 136 136   POTASSIUM 4.1 4.1   CHLORIDE 97 93*   CO2 27 26   GLUCOSE 120* 192*   BUN 29* 44*   CREATININE 1.58* 1.77*   CALCIUM 10.2 10.5*                   Imaging  DX-CHEST-PORTABLE (1 VIEW)   Final Result      Stable bilateral, right greater than left pulmonary infiltrates.      EC-ECHOCARDIOGRAM COMPLETE W/ CONT   Final Result      CT-CTA CHEST PULMONARY ARTERY W/ RECONS   Final Result      1.  Right worse than left extensive pulmonary abnormality compatible with subacute-chronic lung injury including bronchiectasis, bronchiolectasis, areas of volume loss and some groundglass that could indicate active infection versus hemorrhage or edema      2.  Small right pleural effusion      3.  No CT evidence of pulmonary embolism      DX-CHEST-PORTABLE (1 VIEW)   Final Result      No significant interval change in bilateral pneumonia.           Assessment/Plan  * Syncope and collapse- (present on admission)  Assessment & Plan  EKG shows a sinus rhythm with a rate of 79, there is no ST elevation, there is T wave inversions in lead V1-V6.  QTc 430.  Orthostatic vital  Telemetry monitor  I will check an echocardiography    Pneumonia due to COVID-19 virus- (present on admission)  Assessment & Plan  CT scan of the chest shows evidence for right worse than left extensive pulmonary abnormality compatible with subacute-chronic lung injury including bronchiectasis, bronchiolectasis, areas of volume loss and some groundglass that could indicate active infection versus hemorrhage or edema  Less likely pulmonary hemorrhage, as his Hemoglobin is higher than prior baseline, also patient has chills  This is likely worsening COVID-pneumonia versus superimposed bacterial  Procalcitonin not significantly  elevated, D-dimer not elevated.  Worsening COVID-19 pneumonia.  No need for antibiotics  We will continue with steroids and Lasix    Sepsis (HCC)- (present on admission)  Assessment & Plan  This is Sepsis Present on admission  SIRS criteria identified on my evaluation include: Tachycardia, with heart rate greater than 90 BPM and Tachypnea, with respirations greater than 20 per minute  Source is pneumonia  Sepsis protocol initiated  Fluid resuscitation ordered per protocol  Crystalloid Fluid Administration: Fluid resuscitation ordered per standard protocol - 30 mL/kg per current or ideal body weight  Decadron started for COVID-pneumonia  Reassessment: I have reassessed the patient's hemodynamic status    Gastroesophageal reflux disease without esophagitis- (present on admission)  Assessment & Plan  Resume home pantoprazole    Stage 3a chronic kidney disease (HCC)- (present on admission)  Assessment & Plan  Avoid/minimize nephrotoxins as much as possible, renally dose medications, monitor inputs and outputs     Acute on chronic respiratory failure with hypoxia (HCC)- (present on admission)  Assessment & Plan  Likely due from worsening COVID-19 pneumonia.  Discussed with infection control, is considered an old infection, and any precautions.  Pulmonary apparently consulted on admission, follow recommendations  Oxygen as needed, Respiratory protocol, Bronchodilators, Incentive spirometry  Continue with Lasix 40 mg IV twice daily, continue to monitor necessity daily  Has been weaned down to 4 L 02, however required up to 15 L today after presyncopal episodes.  Likely vasovagal response while in the bathroom.  We will continue to monitor closely.  Given patient's difficulty weaning, if unable to wean below 8 L with ambulation may require long-term placement in LTAC or SNF, will need to be reevaluated daily.  Repeat chest x-ray, D-dimer, stat labs and EKG all within normal limits  Repeat walk test in a.m. and continue to  wean as tolerated    Diarrhea- (present on admission)  Assessment & Plan  Differentials include related to his COVID infection, other viral infections, C. difficile colitis [has been on antibiotics]  I will check stool for C. difficile toxins.  Supportive care with intravenous fluids monitor electrolytes and replace accordingly   No episodes since patient has been in the hospital    Syncope- (present on admission)  Assessment & Plan  EKG shows a sinus rhythm with a rate of 79, there is no ST elevation, there is T wave inversions in lead V1-V6.  QTc 430.  Orthostatic vital  Telemetry monitor  Echocardiogram showed normal wall motion, EF of 55%.       VTE prophylaxis: SCDs/TEDs    I have performed a physical exam and reviewed and updated ROS and Plan today (4/18/2022). In review of yesterday's note (4/17/2022), there are no changes except as documented above.

## 2022-04-18 NOTE — PROGRESS NOTES
Telemetry Shift Summary     Rhythm: SR-ST when ambulating  HR Range:   Ectopy: fPVCs, fPACs, bigem,trigem, couplet  Measurements: 0.22/0.08/0.36           Normal Values  Rhythm SR  HR Range    Measurements 0.12-0.20 / 0.06-0.10  / 0.30-0.52

## 2022-04-18 NOTE — TELEPHONE ENCOUNTER
RPM Notification: Medium Alert  Actions: None    Total time (minutes) spent communicating with patient: 3    At  0629 low SPo2 alert of 83%. Contacted patient. Patient states he is doing well just went to the bathroom. Patient SPo2 increased to 89%.

## 2022-04-18 NOTE — TELEPHONE ENCOUNTER
RPM Notification: Readmit  Actions: None    Total time (minutes) spent communicating with patient: 3    [facility] called to let us know they will be removing pt from the monitor because he had another syncopal episode. They will be reconnecting him once he is discharged again.

## 2022-04-18 NOTE — FLOWSHEET NOTE
04/18/22 0814   Events/Summary/Plan   Events/Summary/Plan Rapid response called for synchope/ hypoxic event when going to the bathroom.   Vital Signs   Pulse (!) 102   Respiration (!) 26   Pulse Oximetry 96 %   $ Pulse Oximetry (Spot Check) Yes   Respiratory Assessment   Respiratory Pattern Within Normal Limits   Level of Consciousness Alert   Chest Exam   Work Of Breathing / Effort Mild;Shallow   Oxygen   O2 (LPM) 17   O2 Delivery Device Oxymask

## 2022-04-19 PROBLEM — E11.65 CONTROLLED TYPE 2 DIABETES MELLITUS WITH HYPERGLYCEMIA (HCC): Status: ACTIVE | Noted: 2022-04-19

## 2022-04-19 LAB
ANION GAP SERPL CALC-SCNC: 12 MMOL/L (ref 7–16)
BACTERIA BLD CULT: NORMAL
BACTERIA BLD CULT: NORMAL
BUN SERPL-MCNC: 40 MG/DL (ref 8–22)
CALCIUM SERPL-MCNC: 10.1 MG/DL (ref 8.4–10.2)
CHLORIDE SERPL-SCNC: 91 MMOL/L (ref 96–112)
CO2 SERPL-SCNC: 30 MMOL/L (ref 20–33)
CREAT SERPL-MCNC: 1.46 MG/DL (ref 0.5–1.4)
ERYTHROCYTE [DISTWIDTH] IN BLOOD BY AUTOMATED COUNT: 45.3 FL (ref 35.9–50)
GFR SERPLBLD CREATININE-BSD FMLA CKD-EPI: 50 ML/MIN/1.73 M 2
GLUCOSE BLD STRIP.AUTO-MCNC: 139 MG/DL (ref 65–99)
GLUCOSE BLD STRIP.AUTO-MCNC: 151 MG/DL (ref 65–99)
GLUCOSE BLD STRIP.AUTO-MCNC: 158 MG/DL (ref 65–99)
GLUCOSE BLD STRIP.AUTO-MCNC: 188 MG/DL (ref 65–99)
GLUCOSE SERPL-MCNC: 115 MG/DL (ref 65–99)
HCT VFR BLD AUTO: 46.9 % (ref 42–52)
HGB BLD-MCNC: 15.6 G/DL (ref 14–18)
MAGNESIUM SERPL-MCNC: 2.4 MG/DL (ref 1.5–2.5)
MCH RBC QN AUTO: 31.6 PG (ref 27–33)
MCHC RBC AUTO-ENTMCNC: 33.3 G/DL (ref 33.7–35.3)
MCV RBC AUTO: 94.9 FL (ref 81.4–97.8)
PLATELET # BLD AUTO: 293 K/UL (ref 164–446)
PMV BLD AUTO: 10.2 FL (ref 9–12.9)
POTASSIUM SERPL-SCNC: 4.1 MMOL/L (ref 3.6–5.5)
RBC # BLD AUTO: 4.94 M/UL (ref 4.7–6.1)
SIGNIFICANT IND 70042: NORMAL
SIGNIFICANT IND 70042: NORMAL
SITE SITE: NORMAL
SITE SITE: NORMAL
SODIUM SERPL-SCNC: 133 MMOL/L (ref 135–145)
SOURCE SOURCE: NORMAL
SOURCE SOURCE: NORMAL
WBC # BLD AUTO: 16 K/UL (ref 4.8–10.8)

## 2022-04-19 PROCEDURE — 99233 SBSQ HOSP IP/OBS HIGH 50: CPT | Performed by: INTERNAL MEDICINE

## 2022-04-19 PROCEDURE — 94640 AIRWAY INHALATION TREATMENT: CPT

## 2022-04-19 PROCEDURE — 83735 ASSAY OF MAGNESIUM: CPT

## 2022-04-19 PROCEDURE — 700102 HCHG RX REV CODE 250 W/ 637 OVERRIDE(OP): Performed by: HOSPITALIST

## 2022-04-19 PROCEDURE — 770020 HCHG ROOM/CARE - TELE (206)

## 2022-04-19 PROCEDURE — 80048 BASIC METABOLIC PNL TOTAL CA: CPT

## 2022-04-19 PROCEDURE — 82962 GLUCOSE BLOOD TEST: CPT | Mod: 91

## 2022-04-19 PROCEDURE — 94760 N-INVAS EAR/PLS OXIMETRY 1: CPT

## 2022-04-19 PROCEDURE — 85027 COMPLETE CBC AUTOMATED: CPT

## 2022-04-19 PROCEDURE — A9270 NON-COVERED ITEM OR SERVICE: HCPCS | Performed by: HOSPITALIST

## 2022-04-19 RX ORDER — AMOXICILLIN 250 MG
2 CAPSULE ORAL 2 TIMES DAILY
Qty: 30 TABLET | Refills: 0 | Status: SHIPPED
Start: 2022-04-19 | End: 2023-06-20

## 2022-04-19 RX ORDER — BISACODYL 10 MG
10 SUPPOSITORY, RECTAL RECTAL DAILY
Refills: 0 | Status: SHIPPED
Start: 2022-04-19 | End: 2023-06-20

## 2022-04-19 RX ORDER — ONDANSETRON 4 MG/1
4 TABLET, ORALLY DISINTEGRATING ORAL EVERY 4 HOURS PRN
Qty: 10 TABLET | Refills: 0 | Status: SHIPPED
Start: 2022-04-19 | End: 2023-06-20

## 2022-04-19 RX ORDER — POLYETHYLENE GLYCOL 3350 17 G/17G
17 POWDER, FOR SOLUTION ORAL
Refills: 3 | Status: SHIPPED
Start: 2022-04-19 | End: 2023-06-20

## 2022-04-19 RX ADMIN — OMEPRAZOLE 20 MG: 20 CAPSULE, DELAYED RELEASE ORAL at 04:40

## 2022-04-19 RX ADMIN — RIVAROXABAN 10 MG: 10 TABLET, FILM COATED ORAL at 04:40

## 2022-04-19 RX ADMIN — INSULIN HUMAN 1 UNITS: 100 INJECTION, SOLUTION PARENTERAL at 11:25

## 2022-04-19 RX ADMIN — BUDESONIDE AND FORMOTEROL FUMARATE DIHYDRATE 2 PUFF: 80; 4.5 AEROSOL RESPIRATORY (INHALATION) at 18:59

## 2022-04-19 RX ADMIN — DEXAMETHASONE 6 MG: 4 TABLET ORAL at 04:40

## 2022-04-19 RX ADMIN — BUPROPION HYDROCHLORIDE 150 MG: 150 TABLET, EXTENDED RELEASE ORAL at 17:53

## 2022-04-19 RX ADMIN — FINASTERIDE 5 MG: 5 TABLET, FILM COATED ORAL at 17:53

## 2022-04-19 RX ADMIN — BUDESONIDE AND FORMOTEROL FUMARATE DIHYDRATE 2 PUFF: 80; 4.5 AEROSOL RESPIRATORY (INHALATION) at 07:49

## 2022-04-19 RX ADMIN — BUPROPION HYDROCHLORIDE 150 MG: 150 TABLET, EXTENDED RELEASE ORAL at 04:40

## 2022-04-19 RX ADMIN — ALLOPURINOL 200 MG: 100 TABLET ORAL at 17:53

## 2022-04-19 RX ADMIN — INSULIN HUMAN 1 UNITS: 100 INJECTION, SOLUTION PARENTERAL at 17:56

## 2022-04-19 RX ADMIN — MONTELUKAST 10 MG: 10 TABLET, FILM COATED ORAL at 17:53

## 2022-04-19 ASSESSMENT — ENCOUNTER SYMPTOMS
SHORTNESS OF BREATH: 1
COUGH: 1

## 2022-04-19 ASSESSMENT — PAIN DESCRIPTION - PAIN TYPE: TYPE: ACUTE PAIN

## 2022-04-19 NOTE — DISCHARGE PLANNING
Anticipated Discharge Disposition: post acute placement vs Home Health    Action: SW intern spoke to pt at bedside with family members present. SW intern discussed SNF choice with pt and pt stated he was confused on why he needed SNF if PT and OT have not seen him. SW intern advised pt to speak to his doctor and left the SNF choice form with the pt.     Barriers to Discharge: Medical clearance, SNF choice.     Plan: CM to continue to follow for discharge needs. CM to follow up with PT and OT evals.     Reviewed by Shameka CHOE

## 2022-04-19 NOTE — DISCHARGE PLANNING
Anticipated Discharge Disposition: Home with HH and BNB oxygen      Action: CM RN received notification that pt wanting to DC home instead of SNF. Choice sent for [facility]. Walking 02 charted, [facility], signed by MD and faxed to [phone].     Barriers to Discharge: Home oxygen and HH setup      Plan: Case management to follow up with HH and oxygen acceptance

## 2022-04-19 NOTE — PROGRESS NOTES
"Hospital Medicine Daily Progress Note    Date of Service  4/19/2022    Chief Complaint  Juan José Pate is a 72 y.o. male admitted 4/14/2022 with worsening shortness of breath    Hospital Course  Per notes, \"72 y.o. male with a past medical history of chronic kidney disease stage III, gastroesophageal reflux disease who presented 4/14/2022 with an episode of syncope and worsening shortness of breath.  [facility] and was just discharged recently on 4 L of oxygen.  However patient continued to have worsening shortness of breath on the day of admission he had a syncopal episode without prior palpitations chest pain dizziness or lightheadedness.  In addition, his oxygen requirement increased, reportedly he was saturating 82% on 8 L of oxygen.  Patient denies noticing having any extremity pain redness or swelling. \"      Interval Problem Update  4/15: Patient was initially diagnosed with Covid on March 12, has been in the hospital and on 4 L of oxygen since then.  Has acute worsening.  Walk test completed today and still requiring up to 10 L, not safe to discharge at this time.  Did have some crackles so we will diurese.     4/16: Patient improving, but still requiring up to 8 L of supplemental O2 when ambulating.  I do not feel he is safe for discharge at this time.  Can likely DC in 24 hours if continues to wean down on oxygen.  Already has oxygen tank at home, but prescription will need to be adjusted so we will repeat walk test in a.m.     4/17: Patient still using 8 L supplemental O2 when ambulating and desaturating to 85%.  Discussed with patient's wife and patient, given his previously failed outpatient therapy and readmission to the hospital I think he needs more time and to wean down off of oxygen.  Have arranged for home monitoring as well.    4/18: Patient had weaned down to 4 L of supplemental O2.  However he had a rapid response was called on the morning of " "4/18 after patient had a pre-syncopal episode while in the bathroom.  It is likely that he had a vasovagal response.  However afterwards he was requiring 15 L supplemental O2.  Stat labs, EKG, x-ray, blood sugar and vital signs are all within normal limits. \"    4/19. Cognitive deficits. Still needing O2.  On standby assist. Discussed with family. Prefers resuming HHC with 24/7 support. Needs more O2.    I have personally seen and examined the patient at bedside. I discussed the plan of care with patient and bedside RN.    Consultants/Specialty  None    Code Status  Full Code    Disposition  Patient is not medically cleared for discharge.   Anticipate discharge to to home with close outpatient follow-up.  I have placed the appropriate orders for post-discharge needs.    Review of Systems  Review of Systems   Constitutional: Positive for malaise/fatigue.   Respiratory: Positive for cough and shortness of breath.    All other systems reviewed and are negative.       Physical Exam  Temp:  [36.3 °C (97.3 °F)-36.7 °C (98 °F)] 36.4 °C (97.5 °F)  Pulse:  [] 100  Resp:  [18-24] 18  BP: (107-152)/(66-95) 132/78  SpO2:  [94 %-99 %] 95 %    Physical Exam  Vitals and nursing note reviewed.   Constitutional:       Appearance: Normal appearance.   Cardiovascular:      Rate and Rhythm: Normal rate and regular rhythm.      Pulses: Normal pulses.      Heart sounds: Normal heart sounds.   Pulmonary:      Effort: Respiratory distress present.      Breath sounds: Normal breath sounds.      Comments: Bilateral crackles  Abdominal:      General: Abdomen is flat. Bowel sounds are normal.      Palpations: Abdomen is soft.   Musculoskeletal:      Right lower leg: No edema.      Left lower leg: No edema.   Skin:     Findings: No lesion or rash.   Neurological:      General: No focal deficit present.      Mental Status: He is alert and oriented to person, place, and time. Mental status is at baseline.      Comments: Cognitive deficits "   Psychiatric:      Comments: Pleasant         Fluids    Intake/Output Summary (Last 24 hours) at 4/19/2022 0826  Last data filed at 4/19/2022 0200  Gross per 24 hour   Intake 360 ml   Output 2000 ml   Net -1640 ml       Laboratory  Recent Labs     04/18/22  0820 04/19/22  0201   WBC 11.9* 16.0*   RBC 5.33 4.94   HEMOGLOBIN 16.8 15.6   HEMATOCRIT 50.8 46.9   MCV 95.3 94.9   MCH 31.5 31.6   MCHC 33.1* 33.3*   RDW 45.8 45.3   PLATELETCT 258 293   MPV 10.7 10.2     Recent Labs     04/18/22  0820 04/19/22  0201   SODIUM 136 133*   POTASSIUM 4.1 4.1   CHLORIDE 93* 91*   CO2 26 30   GLUCOSE 192* 115*   BUN 44* 40*   CREATININE 1.77* 1.46*   CALCIUM 10.5* 10.1                   Imaging  DX-CHEST-PORTABLE (1 VIEW)   Final Result      Stable bilateral, right greater than left pulmonary infiltrates.      EC-ECHOCARDIOGRAM COMPLETE W/ CONT   Final Result      CT-CTA CHEST PULMONARY ARTERY W/ RECONS   Final Result      1.  Right worse than left extensive pulmonary abnormality compatible with subacute-chronic lung injury including bronchiectasis, bronchiolectasis, areas of volume loss and some groundglass that could indicate active infection versus hemorrhage or edema      2.  Small right pleural effusion      3.  No CT evidence of pulmonary embolism      DX-CHEST-PORTABLE (1 VIEW)   Final Result      No significant interval change in bilateral pneumonia.           Assessment/Plan  * Acute on chronic respiratory failure with hypoxia, CoVID PNA, syncope (HCC)- (present on admission)  Assessment & Plan  Likely due from worsening COVID-19 pneumonia.  Discussed with infection control, is considered an old infection, and any precautions.  Pulmonary apparently consulted on admission, follow recommendations  Oxygen as needed, Respiratory protocol, Bronchodilators, Incentive spirometry  Continue with Lasix 40 mg IV twice daily, continue to monitor necessity daily  Has been weaned down to 4 L 02, however required up to 15 L today after  "presyncopal episodes.  Likely vasovagal response while in the bathroom.  We will continue to monitor closely.  Given patient's difficulty weaning, if unable to wean below 8 L with ambulation may require long-term placement in LTAC or SNF, will need to be reevaluated daily.  Repeat chest x-ray, D-dimer, stat labs and EKG all within normal limits  Repeat walk test in a.m. and continue to wean as tolerated\"    Needs O2.  Ordered home monitoring, HHC and O2.    Pneumonia due to COVID-19 virus- (present on admission)  Assessment & Plan  CT scan of the chest shows evidence for right worse than left extensive pulmonary abnormality compatible with subacute-chronic lung injury including bronchiectasis, bronchiolectasis, areas of volume loss and some groundglass that could indicate active infection versus hemorrhage or edema  Less likely pulmonary hemorrhage, as his Hemoglobin is higher than prior baseline, also patient has chills  This is likely worsening COVID-pneumonia versus superimposed bacterial  Procalcitonin not significantly elevated, D-dimer not elevated.  Worsening COVID-19 pneumonia.  No need for antibiotics  We will continue with steroids and Lasix\"    Follow up with [facility] and remote monitoring.    Sepsis (HCC)- (present on admission)  Assessment & Plan  This is Sepsis Present on admission  SIRS criteria identified on my evaluation include: Tachycardia, with heart rate greater than 90 BPM and Tachypnea, with respirations greater than 20 per minute  Source is pneumonia  Sepsis protocol initiated  Fluid resuscitation ordered per protocol  Crystalloid Fluid Administration: Fluid resuscitation ordered per standard protocol - 30 mL/kg per current or ideal body weight  Decadron started for COVID-pneumonia  Reassessment: I have reassessed the patient's hemodynamic status    Syncope and collapse- (present on admission)  Assessment & Plan  EKG shows a sinus rhythm with a rate of 79, there is no ST " "elevation, there is T wave inversions in lead V1-V6.  QTc 430.  Orthostatic vital  Telemetry monitor  I will check an echocardiography\"    EF 60%    Gastroesophageal reflux disease without esophagitis- (present on admission)  Assessment & Plan  Resume home pantoprazole    Stage 3a chronic kidney disease (HCC)- (present on admission)  Assessment & Plan  Avoid/minimize nephrotoxins as much as possible, renally dose medications, monitor inputs and outputs     Diarrhea- (present on admission)  Assessment & Plan  Differentials include related to his COVID infection, other viral infections, C. difficile colitis [has been on antibiotics]  I will check stool for C. difficile toxins.  Supportive care with intravenous fluids monitor electrolytes and replace accordingly   No episodes since patient has been in the hospital    Syncope- (present on admission)  Assessment & Plan  EKG shows a sinus rhythm with a rate of 79, there is no ST elevation, there is T wave inversions in lead V1-V6.  QTc 430.  Orthostatic vital  Telemetry monitor  Echocardiogram showed normal wall motion, EF of 55%.\"    Outpatient Cardiology for Holter/event monitor       VTE prophylaxis: SCDs/TEDs    I have performed a physical exam and reviewed and updated ROS and Plan today (4/19/2022). In review of yesterday's note (4/18/2022), there are no changes except as documented above.      "

## 2022-04-19 NOTE — DISCHARGE PLANNING
Anticipated Discharge Disposition: Home    Action: SW intern spoke to pt at bedside to discuss DME choice. Pt already had walker at bedside.    Barriers to Discharge: None.     Plan: CM to continue to follow for discharge needs.    SANDEE CHOE

## 2022-04-19 NOTE — PROGRESS NOTES
Telemetry Shift Summary    Rhythm SR  HR Range 69 - 96  Ectopy r PACs, r-f PVCs, r-o COUPs, r TRIPs, r BIG, & r TRIG  Measurements 0.20/0.08/0.40        Normal Values  Rhythm SR  HR Range    Measurements 0.12-0.20 / 0.06-0.10  / 0.30-0.52

## 2022-04-19 NOTE — CARE PLAN
The patient is Watcher - Medium risk of patient condition declining or worsening    Shift Goals  Clinical Goals: Have BM, maintain O2 above 90%  Patient Goals: Rest, comfort    Progress made toward(s) clinical / shift goals:      Stool softeners were added to patients MAR, pt was able to have a BM today.     Problem: Knowledge Deficit - Standard  Goal: Patient and family/care givers will demonstrate understanding of plan of care, disease process/condition, diagnostic tests and medications  Outcome: Progressing     Problem: Fluid Volume  Goal: Fluid volume balance will be maintained  Outcome: Progressing     Problem: Pain - Standard  Goal: Alleviation of pain or a reduction in pain to the patient’s comfort goal  Outcome: Progressing     Problem: Fall Risk  Goal: Patient will remain free from falls  Outcome: Progressing       Patient is not progressing towards the following goals:    A rapid response did need to be called on this patient today due to low BP and low Oxygen. Pt had to be placed on 15L of oxygen, MD was notified and new orders were placed. Will continue to monitor pt.     Problem: Hemodynamics  Goal: Patient's hemodynamics, fluid balance and neurologic status will be stable or improve  Outcome: Not Progressing     Problem: Respiratory  Goal: Patient will achieve/maintain optimum respiratory ventilation and gas exchange  Outcome: Not Progressing

## 2022-04-19 NOTE — PROGRESS NOTES
Monitor Summary     Rhythm:SR-ST   Measurements: 0.20/0.08/0.36  ECTOPIES: rTRIG, rBIG, oPVC, rCOUP        Normal Values  Rhythm SR  HR Range    Measurements 0.12-0.20 / 0.06-0.10  / 0.30-0.52

## 2022-04-19 NOTE — DISCHARGE PLANNING
Received Choice form at [phone]  Agency/Facility Name: [facility]  Referral sent per Choice form @ [phone]

## 2022-04-19 NOTE — CARE PLAN
The patient is Watcher - Medium risk of patient condition declining or worsening    Shift Goals  Clinical Goals: Monitor oxygenation, decrease SOB, wean oxygen, Perform interventions upon ambulation to decrease feelings of dizziness  Patient Goals: Go Home and mobilize safely    Progress made toward(s) clinical / shift goals:  Patients oxygenation has remained stable with decreasing oxygen demand. Patient not wanting to increase level of ambulation this shift.     Patient is not progressing towards the following goals:      Problem: Knowledge Deficit - Standard  Goal: Patient and family/care givers will demonstrate understanding of plan of care, disease process/condition, diagnostic tests and medications  Outcome: Progressing   Patient demonstrated verbal understanding of education provided. Reinforcement required.   Problem: Respiratory  Goal: Patient will achieve/maintain optimum respiratory ventilation and gas exchange  Outcome: Progressing  Patients oxygenation has remained stable with decreasing oxygen demand.

## 2022-04-19 NOTE — FLOWSHEET NOTE
04/19/22 0749   Events/Summary/Plan   Events/Summary/Plan MDI given   Skin Inspection Respiratory Device Intact   Vital Signs   Pulse 100   Respiration 18   Pulse Oximetry 95 %   $ Pulse Oximetry (Spot Check) Yes   Respiratory Assessment   Respiratory Pattern Within Normal Limits   Level of Consciousness Alert   Chest Exam   Work Of Breathing / Effort Mild   Breath Sounds   RUL Breath Sounds Clear   RML Breath Sounds Clear;Diminished   RLL Breath Sounds Diminished   [place] Breath Sounds Clear   LLL Breath Sounds Diminished   Secretions   Cough Non Productive;Strong   How Sputum Obtained Spontaneous   Oxygen   O2 (LPM) 4   O2 Delivery Device Oxymask

## 2022-04-19 NOTE — DISCHARGE SUMMARY
Discharge Summary    CHIEF COMPLAINT ON ADMISSION  Chief Complaint   Patient presents with   • Shortness of Breath   • Cough   • Diarrhea   • Dizziness     Diarrhea a few days ago,  SOB increased since last night on home oxygen at 6-8 liters increased demands today  Ambulating with home health RN today and had a syncopal episode  + Covid March 9th admitted until March 30th   Diagnosed with pneumonia last week        Reason for Admission  EMS     Admission Date  4/14/2022    CODE STATUS  Full Code    HPI & HOSPITAL COURSE  This is a 72 y.o. male here with Shortness of Breath, Cough, Diarrhea, and Dizziness (Diarrhea a few days ago,/SOB increased since last night on home oxygen at 6-8 liters increased demands today/Ambulating with home health RN today and had a syncopal episode/+ Covid March 9th admitted until March 30th /Diagnosed with pneumonia last week )  Please review Dr. Ramiro Harvey M.D. notes for further details of history of present illness, past medical/social/family histories, allergies and medications.   He has a history of chronic kidney disease stage III, gastroesophageal reflux disease, recent CoVID pneumonia [facility], presented 4/14/2022 with an episode of syncope and worsening shortness of breath. HE also had a syncopal episode. His O2 need increased.  At the ED, he is afebrile, normotensive.  CTA Chest:      1.  Right worse than left extensive pulmonary abnormality compatible with subacute-chronic lung injury including bronchiectasis, bronchiolectasis, areas of volume loss and some groundglass that could indicate active infection versus hemorrhage or edema       2.  Small right pleural effusion       3.  No CT evidence of pulmonary embolism   No leukocytosis.  He was put on O2 nad decadron along with inhalers.  At some point he is 4LO2 at rest but required up to 10 L exertion. Family was around and wanted him discharged back to home resume home health care and promise 24/7 family  care as they all declined skilled or rehab.    At discharge date, Juan José Pate afebrile and hemodynamically stable.  Juan José Pate wanted to be discharged today.    Discharge Physical Exam  Vitals and nursing note reviewed.   Constitutional:       Appearance: Normal appearance.   Cardiovascular:      Rate and Rhythm: Normal rate and regular rhythm.      Pulses: Normal pulses.      Heart sounds: Normal heart sounds.   Pulmonary:      Effort: Respiratory distress present.      Breath sounds: Normal breath sounds.      Comments: Bilateral crackles  Abdominal:      General: Abdomen is flat. Bowel sounds are normal.      Palpations: Abdomen is soft.   Musculoskeletal:      Right lower leg: No edema.      Left lower leg: No edema.   Skin:     Findings: No lesion or rash.   Neurological:      General: No focal deficit present.      Mental Status: He is alert and oriented to person, place, and time. Mental status is at baseline.      Comments: Cognitive deficits   Psychiatric:      Comments: Pleasant           Imaging  DX-CHEST-PORTABLE (1 VIEW)   Final Result      Stable bilateral, right greater than left pulmonary infiltrates.      EC-ECHOCARDIOGRAM COMPLETE W/ CONT   Final Result      CT-CTA CHEST PULMONARY ARTERY W/ RECONS   Final Result      1.  Right worse than left extensive pulmonary abnormality compatible with subacute-chronic lung injury including bronchiectasis, bronchiolectasis, areas of volume loss and some groundglass that could indicate active infection versus hemorrhage or edema      2.  Small right pleural effusion      3.  No CT evidence of pulmonary embolism      DX-CHEST-PORTABLE (1 VIEW)   Final Result      No significant interval change in bilateral pneumonia.              Therefore, he is discharged in fair and stable condition to home with organized home healthcare and close outpatient follow-up.    The patient met 2-midnight criteria for an inpatient stay at the time of discharge.    Discharge  Date  4/20/2022    FOLLOW UP ITEMS POST DISCHARGE      DISCHARGE DIAGNOSES  Principal Problem:    Acute on chronic respiratory failure with hypoxia, CoVID PNA, syncope (HCC) POA: Yes  Active Problems:    Syncope POA: Yes    Diarrhea POA: Yes    Stage 3a chronic kidney disease (HCC) POA: Yes    Gastroesophageal reflux disease without esophagitis POA: Yes    Syncope and collapse POA: Yes    Sepsis (HCC) POA: Yes    Pneumonia due to COVID-19 virus POA: Yes    Controlled type 2 diabetes mellitus with hyperglycemia (HCC) POA: Unknown        FOLLOW UP  Future Appointments   Date Time Provider Department Center   4/22/2022  4:40 PM Julián Calix M.D. [facility] None   5/3/2022  4:20 PM Monica Farris M.D. [facility] None     No follow-up provider specified.  PENDING SNF or LTAC    Nursing, please call [phone] to schedule appointments; please provide phone numbers/addresses for clinic appointments if unable to arrange.  When all above criteria are met, ambulate patient (if applicable) and if patient is no longer symptomatic, stable vitals, if tolerating food and moving bowels patient can be discharged.  Any questions/clarifications with the discharge criteria above PLEASE call or Voalte Dr. Radha demarco.    NURSING: Copy/summarize below, provide appointment contact information to provide at your patient discharge instruction sheet. Provide phone numbers/resources for follow-ups.  Follow up [facility]yllan [facility] M[facility]D.or [facility] in [facility] week. Follow up with [facility] re: CoVID  Follow up with or refer to Cardiology in 1-2 weeks for syncope; Holter/event monitor recommended.  Return to ER in the event of new or worsening symptoms. Please note importance of compliance and the patient has agreed to proceed with all medical recommendations and follow up plan indicated above. All medications come with benefits and risks. Risks may include permanent injury or death and these risks can be minimized with close reassessment and  monitoring. Please make it to your scheduled follow ups with your primary provider, and/or specialists clinic.  Quarantine, mask, hand washing, proper hygiene and seeing a doctor if you have fever, hypoxia or respiratory/GI symptoms or symptoms of thrombosis (stroke/chest pain/new extremity swelling) are MUSTS. Self isolation if still symptomatic or if fever (must self isolate until NO fever for 72 hours from last temp without use of anti-fever medications).  As advised by the Centers for Disease Control and Prevention (CDC), please isolate yourself for at least 10 days since the onset of your symptoms AND one day without a fever without the use of fever reducing medications AND with improved symptoms.  This is true for everyone, even if your test is negative because of high rates of falsely negative results.  Restrict activities outside your home for 10 days. Do not go to work, school, public areas, or restaurants. Avoid using public transportation, ride-sharing, or taxis.  If your symptoms worsen or you need a return to work note please call the COVID line [phone].  If you become sicker (even if your initial COVID test is negative), have difficulty breathing, chest pain, you are unable to eat or drink enough, or have severe vomiting, diarrhea or weakness, you may need to return to the Emergency Department or contact your clinic provider for re-evaluation.  If you need care and are coming into the hospital or one of our clinics, inform the healthcare facility by phone that you have been tested for COVID-19 prior to entry.  Put on a facemask before you enter the facility or before emergency services arrive if you have called 911.    Unless you have severe difficulty breathing you will be expected to wear your mask throughout your hospital/clinic visit in order to protect our staff and other employees.  Postpone all elective medical appointments within your isolation period, including but not limited to physical  therapy, dental, chiropractic, routine check-ups, etc., until cleared by the healthline, local health department, or your personal physician. If you have any upcoming elective appointments, please contact that office directly in order to cancel, reschedule or be evaluated for a potential virtual visit.  IF YOU LIVE WITH OTHERS  Please have ALL household members quarantine until your test results are back.  They should not go into public, to  or school, or go to work.  If any of your household members need a work note, please have them call the [employer] at [phone].  If living with others, try to have your own bathroom and bedroom if possible.  Please try and wipe down high-touch surfaces (counters, tabletops, doorknobs, bathroom fixtures, toilets, phones, keyboards, tablets, and bedside tables) at least twice a day. Avoid sharing personal household items. You should not share dishes, drinking glasses, cups, eating utensils, towels, or bedding with other people in your home. After using these items, they should be washed thoroughly with soap and water.  Also, clean any surfaces that may have blood, stool, or body fluids on them. Use a household cleaning spray or wipe, according to the label instructions. Labels contain instructions for safe and effective use of the cleaning product including precautions you should take when applying the product, such as wearing gloves and making sure you have good ventilation during use of the product.   Clean your hands often. Wash your hands often with soap and water for at least 20 seconds. If soap and water are not available, clean your hands with an alcohol-based hand  that contains at least 60% alcohol, covering all surfaces of your hands and rubbing them together until they feel dry. Soap and water should be used preferentially if hands are visibly dirty. Avoid touching your eyes, nose, and mouth with unwashed hands.   Cover your coughs and sneezes     Please see the resources below for more information.    CDC Corona Website https://www.cdc.gov/coronavirus/2019-ncov/index.html  General Information https://www.cdc.gov/coronavirus/2019-ncov/faq.html   [facility]: [phone]  [facility] Line [phone]        MEDICATIONS ON DISCHARGE     Medication List      START taking these medications      Instructions   bisacodyl 10 MG Supp  Commonly known as: Dulcolax   Insert 1 Suppository into the rectum every day at 6 PM.  Dose: 10 mg     insulin regular 100 Unit/mL Soln  Commonly known as: HumuLIN R   Inject 1-6 Units under the skin 4 Times a Day,Before Meals and at Bedtime.  Dose: 1-6 Units     magnesium hydroxide 400 MG/5ML Susp  Commonly known as: MILK OF MAGNESIA   Take 30 mL by mouth 1 time a day as needed (if polyethylene glycol ineffective after 24 hours).  Dose: 30 mL     ondansetron 4 MG Tbdp  Commonly known as: ZOFRAN ODT   Take 1 Tablet by mouth every four hours as needed for Nausea (give PO if IV route is unavailable.).  Dose: 4 mg     polyethylene glycol/lytes 17 g Pack  Commonly known as: MIRALAX   Take 1 Packet by mouth 1 time a day as needed (if sennosides and docusate ineffective after 24 hours).  Dose: 17 g     senna-docusate 8.6-50 MG Tabs  Commonly known as: PERICOLACE or SENOKOT S   Take 2 Tablets by mouth 2 times a day.  Dose: 2 Tablet        CONTINUE taking these medications      Instructions   acetaminophen 500 MG Tabs  Commonly known as: TYLENOL   Take 500 mg by mouth every 6 hours as needed for Moderate Pain.  Dose: 500 mg     albuterol 108 (90 Base) MCG/ACT Aers inhalation aerosol   Inhale 1-2 Puffs every four hours as needed for Shortness of Breath.  Dose: 1-2 Puff     allopurinol 100 MG Tabs  Commonly known as: ZYLOPRIM   Take 200 mg by mouth every evening.  Dose: 200 mg     benzonatate 100 MG Caps  Commonly known as: TESSALON   Take 200 mg by mouth 3 times a day as needed. Indications: Cough  Dose: 200 mg      buPROPion  MG Tb12 sustained-release tablet  Commonly known as: WELLBUTRIN-SR   Take 150 mg by mouth 2 times a day.  Dose: 150 mg     dexamethasone 4 MG Tabs  Commonly known as: DECADRON   Take 6 mg by mouth every day. Pt started on 4/1/2022 [facility]  Dose: 6 mg     diclofenac sodium 1 % Gel  Commonly known as: Voltaren   Apply 2 g topically 4 times a day as needed (Apply's on shoulders for pain).  Dose: 2 g     finasteride 5 MG Tabs  Commonly known as: PROSCAR   Take 5 mg by mouth every evening.  Dose: 5 mg     fluticasone-salmeterol 100-50 MCG/DOSE Aepb  Commonly known as: Advair   Inhale 1 Puff every day.  Dose: 1 Puff     guaifenesin 200 MG tablet   Take 400 mg by mouth in the morning, at noon, and at bedtime.  Dose: 400 mg     hydrOXYzine HCl 25 MG Tabs  Commonly known as: ATARAX   Take 25 mg by mouth 1 time a day as needed for Anxiety.  Dose: 25 mg     Jardiance 25 MG Tabs  Generic drug: Empagliflozin   Take 12.5 mg by mouth every day.  Dose: 12.5 mg     lidocaine 5 % Ptch  Commonly known as: LIDODERM   Place 1 Patch on the skin 2 times a day as needed (Apply's on both shoulders for pain).  Dose: 1 Patch     metFORMIN  MG Tb24  Commonly known as: GLUCOPHAGE XR   Take 2,000 mg by mouth every evening.  Dose: 2,000 mg     montelukast 10 MG Tabs  Commonly known as: SINGULAIR   Take 10 mg by mouth every evening.  Dose: 10 mg     pantoprazole 40 MG Tbec  Commonly known as: PROTONIX   Take 40 mg by mouth every day.  Dose: 40 mg     tamsulosin 0.4 MG capsule  Commonly known as: FLOMAX   Take 0.4 mg by mouth every day.  Dose: 0.4 mg        STOP taking these medications    amoxicillin-clavulanate 875-125 MG Tabs  Commonly known as: AUGMENTIN            Allergies  Allergies   Allergen Reactions   • Other Food Shortness of Breath     Per patient's wife, patient is allergic to teriyaki sauce, no specific ingredients identified as allergen per wifeАнна. Reports reaction  is wheezing, respiratory distress.   • Oxycodone Vomiting and Nausea   • Sulfa Drugs Rash     .       DIET  Orders Placed This Encounter   Procedures   • Diet Order Diet: Cardiac; Second Modifier: (optional): Consistent CHO (Diabetic)     Standing Status:   Standing     Number of Occurrences:   1     Order Specific Question:   Diet:     Answer:   Cardiac [6]     Order Specific Question:   Second Modifier: (optional)     Answer:   Consistent CHO (Diabetic) [4]       ACTIVITY  As per [facility]    CONSULTATIONS      PROCEDURES  DX-CHEST-PORTABLE (1 VIEW)    Result Date: 4/18/2022 4/18/2022 10:31 AM HISTORY/REASON FOR EXAM: Shortness of breath TECHNIQUE/EXAM DESCRIPTION AND NUMBER OF VIEWS: Single portable view of the chest. COMPARISON: 4/14/2022 FINDINGS: There are stable bilateral, right greater than left pulmonary infiltrates. There is no pleural effusion. The heart is normal in size.     Stable bilateral, right greater than left pulmonary infiltrates.    DX-CHEST-PORTABLE (1 VIEW)    Result Date: 4/14/2022 4/14/2022 2:16 PM HISTORY/REASON FOR EXAM:  possible sepsis. Syncope and shortness of breath TECHNIQUE/EXAM DESCRIPTION AND NUMBER OF VIEWS: Single AP view of the chest. COMPARISON: 4/6/2022 FINDINGS: Heart: Stable cardiomegaly Mediastinum: Normal contours. Lungs: Bilateral airspace opacification with peripheral distribution is similar to the prior exam. No pneumothorax is identified. Pleura: No pleural fluid. Bones: No suspicious bony lesions. Lines/tubes: None.     No significant interval change in bilateral pneumonia.    DX-CHEST-PORTABLE (1 VIEW)    Result Date: 4/6/2022 4/6/2022 1:27 PM HISTORY/REASON FOR EXAM: Syncope. TECHNIQUE/EXAM DESCRIPTION AND NUMBER OF VIEWS: Single portable view of the chest. COMPARISON: None FINDINGS: There are diffuse bilateral right greater than left patchy airspace and interstitial infiltrates. There is elevation the right hemidiaphragm. There is no pleural effusion. The heart is  normal in size.     1.  Use bilateral patchy ill-defined airspace and interstitial infiltrates, right greater than left. Differential diagnosis includes atypical infection such as Covid pneumonia, chronic interstitial lung disease and atypical interstitial edema. 2.  Elevation of the right hemidiaphragm.    CT-CTA CHEST PULMONARY ARTERY W/ RECONS    Result Date: 4/14/2022 4/14/2022 3:33 PM HISTORY/REASON FOR EXAM:  PE suspected, high prob. Covid infection March 9 with prolonged hospitalization. TECHNIQUE/EXAM DESCRIPTION: CT angiogram scan for pulmonary embolism with contrast, with reconstructions. 1.25 mm helical sections were obtained from the lung apices through the lung bases following the rapid bolus administration of 75 mL of Omnipaque 350 nonionic contrast. Thin-section overlapping reconstruction interval was utilized. Coronal reconstructions were generated from the axial data. MIP post processing was performed and utilized for the interpretation. Low dose optimization technique was utilized for this CT exam including automated exposure control and adjustment of the mA and/or kV according to patient size. COMPARISON: None FINDINGS: Pulmonary Embolism: No. Main Pulmonary Arteries: No. Segmental branches: No. Lungs: There is extensive abnormality. There are areas of normal-appearing lung and large areas of very abnormal lung with evidence of bronchiectasis, bronchiolectasis, reticular and groundglass opacity. This is greatest in the right mid and lower lung zone. There is also small right pleural fluid that is low density.. Pleura: No left pleural effusion or pleural calcification. Nodes: Scattered lymph nodes with mild right hilar and mediastinal adenopathy. Some of the nodes have some areas of calcification indicating remote granulomatous disease.. Additional findings: Moderate coronary atherosclerosis.     1.  Right worse than left extensive pulmonary abnormality compatible with subacute-chronic lung injury  including bronchiectasis, bronchiolectasis, areas of volume loss and some groundglass that could indicate active infection versus hemorrhage or edema 2.  Small right pleural effusion 3.  No CT evidence of pulmonary embolism    EC-ECHOCARDIOGRAM COMPLETE W/ CONT    Result Date: 2022  Transthoracic Echo Report Echocardiography Laboratory CONCLUSIONS No prior study is available for comparison. The left ventricular ejection fraction is visually estimated to be 55%. Normal regional wall motion. PHYLICIA DOTY Exam Date:         2022                    16:27 Exam Location:     Inpatient Priority:          Routine Ordering Physician:        GWYN MIN Referring Physician: Sonographer:               Tatiana Martinez [facility] Age:    72     Gender:    M MRN:    [id number] :    1950 BSA:    0.346  Ht (in):    6      Wt (lb):    192 Exam Type:     Complete Indications:     Shortness of breath ICD Codes:       786.05 CPT Codes:       37058 BP:   125    /   69     HR:   80 Technical Quality:       Fair MEASUREMENTS  (Male / Female) Normal Values 2D ECHO LV Diastolic Diameter PLAX        4.2 cm                4.2 - 5.9 / 3.9 - 5.3 cm LV Systolic Diameter PLAX         3.1 cm                2.1 - 4.0 cm IVS Diastolic Thickness           0.81 cm               LVPW Diastolic Thickness          0.86 cm               LVOT Diameter                     2 cm                  Estimated LV Ejection Fraction    55 %                  IVC Diameter                      1 cm                  DOPPLER AV Peak Velocity                  1.1 m/s               AV Peak Gradient                  5.2 mmHg              AV Mean Gradient                  3 mmHg                LVOT Peak Velocity                0.84 m/s              AV Area Cont Eq vti               2.3 cm2               Mitral E Point Velocity           0.42 m/s              Mitral E to A Ratio               0.48                  MV Pressure Half Time             35.5 ms                MV Area PHT                       6.2 cm2               MV Deceleration Time              123 ms                TR Peak Velocity                  213 cm/s              PV Peak Velocity                  0.84 m/s              PV Peak Gradient                  2.8 mmHg              RVOT Peak Velocity                0.67 m/s              * Indicates values subject to auto-interpretation LV EF:  55    % FINDINGS Left Ventricle The left ventricle is normal in size and thickness. Normal left ventricular systolic function. The left ventricular ejection fraction is visually estimated to be 55%. Normal regional wall motion. Normal diastolic function. Contrast was used to enhance visualization of the endocardial border. 3 mL of contrast was administered. Existing IV was used at the right arm. Right Ventricle The right ventricle is normal in size and systolic function. Right Atrium The right atrium is normal in size. Normal inferior vena cava size and inspiratory collapse. Left Atrium The left atrium is normal in size. Left atrial volume index is 31 mL/sq m. Mitral Valve Structurally normal mitral valve without significant stenosis or regurgitation. Aortic Valve Structurally normal aortic valve without significant stenosis or regurgitation. Tricuspid Valve Structurally normal tricuspid valve without significant stenosis or regurgitation.right atrial pressure is estimated to be 3 mmHg. Unable to estimate pulmonary artery pressure due to an inadequate tricuspid regurgitant jet. Pulmonic Valve Structurally normal pulmonic valve without significant stenosis or regurgitation. Pericardium Normal pericardium without effusion. Aorta Normal aortic root for body surface area. The ascending aorta diameter is 3.6 cm. Juan José Parada M.D. (Electronically Signed) Final Date:     16 April 2022                 18:09      LABORATORY  Lab Results   Component Value Date    SODIUM 133 (L) 04/19/2022    POTASSIUM 4.1 04/19/2022    CHLORIDE  91 (L) 04/19/2022    CO2 30 04/19/2022    GLUCOSE 115 (H) 04/19/2022    BUN 40 (H) 04/19/2022    CREATININE 1.46 (H) 04/19/2022        Lab Results   Component Value Date    WBC 16.0 (H) 04/19/2022    HEMOGLOBIN 15.6 04/19/2022    HEMATOCRIT 46.9 04/19/2022    PLATELETCT 293 04/19/2022        Total time of the discharge process exceeds 40 minutes.

## 2022-04-19 NOTE — FACE TO FACE
Face to Face Supporting Documentation - Home Health    The encounter with this patient was in whole or in part the primary reason for home health admission.    Date of encounter:   Patient:                    MRN:                       YOB: 2023  Juan José Pate  [id number]  1950     Home health to see patient for:  Skilled Nursing care for assessment, interventions & education, Physical Therapy evaluation and treatment and Occupational therapy evaluation and treatment    Skilled need for:  Medication Management resume Critical access hospital    Skilled nursing interventions to include:  Comment: as above    Homebound status evidenced by:  Needs the assistance of another person in order to leave the home. Leaving home requires a considerable and taxing effort. There is a normal inability to leave the home.    Community Physician to provide follow up care: Dyllan Valencia M.D.     Optional Interventions? No      I certify the face to face encounter for this home health care referral meets the CMS requirements and the encounter/clinical assessment with the patient was, in whole, or in part, for the medical condition(s) listed above, which is the primary reason for home health care. Based on my clinical findings: the service(s) are medically necessary, support the need for home health care, and the homebound criteria are met.  I certify that this patient has had a face to face encounter by myself.  Krunal Garcia M.D. - NPI: [id number]

## 2022-04-20 ENCOUNTER — TELEPHONE (OUTPATIENT)
Dept: OTHER | Facility: MEDICAL CENTER | Age: 72
End: 2022-04-20
Payer: COMMERCIAL

## 2022-04-20 VITALS
HEIGHT: 72 IN | DIASTOLIC BLOOD PRESSURE: 72 MMHG | OXYGEN SATURATION: 93 % | TEMPERATURE: 97.8 F | HEART RATE: 102 BPM | RESPIRATION RATE: 18 BRPM | SYSTOLIC BLOOD PRESSURE: 122 MMHG | WEIGHT: 192.46 LBS | BODY MASS INDEX: 26.07 KG/M2

## 2022-04-20 VITALS — RESPIRATION RATE: 19 BRPM | HEART RATE: 104 BPM | OXYGEN SATURATION: 92 %

## 2022-04-20 LAB
ALBUMIN SERPL BCP-MCNC: 3.9 G/DL (ref 3.2–4.9)
BUN SERPL-MCNC: 37 MG/DL (ref 8–22)
CALCIUM SERPL-MCNC: 10 MG/DL (ref 8.4–10.2)
CHLORIDE SERPL-SCNC: 93 MMOL/L (ref 96–112)
CO2 SERPL-SCNC: 27 MMOL/L (ref 20–33)
CREAT SERPL-MCNC: 1.33 MG/DL (ref 0.5–1.4)
ERYTHROCYTE [DISTWIDTH] IN BLOOD BY AUTOMATED COUNT: 44.5 FL (ref 35.9–50)
GFR SERPLBLD CREATININE-BSD FMLA CKD-EPI: 56 ML/MIN/1.73 M 2
GLUCOSE BLD STRIP.AUTO-MCNC: 128 MG/DL (ref 65–99)
GLUCOSE BLD STRIP.AUTO-MCNC: 161 MG/DL (ref 65–99)
GLUCOSE SERPL-MCNC: 134 MG/DL (ref 65–99)
HCT VFR BLD AUTO: 45.9 % (ref 42–52)
HGB BLD-MCNC: 15.2 G/DL (ref 14–18)
MCH RBC QN AUTO: 31.1 PG (ref 27–33)
MCHC RBC AUTO-ENTMCNC: 33.1 G/DL (ref 33.7–35.3)
MCV RBC AUTO: 93.9 FL (ref 81.4–97.8)
PHOSPHATE SERPL-MCNC: 3.3 MG/DL (ref 2.5–4.5)
PLATELET # BLD AUTO: 294 K/UL (ref 164–446)
PMV BLD AUTO: 9.9 FL (ref 9–12.9)
POTASSIUM SERPL-SCNC: 3.6 MMOL/L (ref 3.6–5.5)
RBC # BLD AUTO: 4.89 M/UL (ref 4.7–6.1)
SODIUM SERPL-SCNC: 132 MMOL/L (ref 135–145)
WBC # BLD AUTO: 11.2 K/UL (ref 4.8–10.8)

## 2022-04-20 PROCEDURE — 94640 AIRWAY INHALATION TREATMENT: CPT

## 2022-04-20 PROCEDURE — 80069 RENAL FUNCTION PANEL: CPT

## 2022-04-20 PROCEDURE — 99239 HOSP IP/OBS DSCHRG MGMT >30: CPT | Performed by: INTERNAL MEDICINE

## 2022-04-20 PROCEDURE — 700102 HCHG RX REV CODE 250 W/ 637 OVERRIDE(OP): Performed by: HOSPITALIST

## 2022-04-20 PROCEDURE — A9270 NON-COVERED ITEM OR SERVICE: HCPCS | Performed by: HOSPITALIST

## 2022-04-20 PROCEDURE — 82962 GLUCOSE BLOOD TEST: CPT

## 2022-04-20 PROCEDURE — 94760 N-INVAS EAR/PLS OXIMETRY 1: CPT

## 2022-04-20 PROCEDURE — 85027 COMPLETE CBC AUTOMATED: CPT

## 2022-04-20 RX ADMIN — DEXAMETHASONE 6 MG: 4 TABLET ORAL at 05:22

## 2022-04-20 RX ADMIN — RIVAROXABAN 10 MG: 10 TABLET, FILM COATED ORAL at 05:22

## 2022-04-20 RX ADMIN — OMEPRAZOLE 20 MG: 20 CAPSULE, DELAYED RELEASE ORAL at 05:22

## 2022-04-20 RX ADMIN — BUPROPION HYDROCHLORIDE 150 MG: 150 TABLET, EXTENDED RELEASE ORAL at 05:22

## 2022-04-20 RX ADMIN — BUDESONIDE AND FORMOTEROL FUMARATE DIHYDRATE 2 PUFF: 80; 4.5 AEROSOL RESPIRATORY (INHALATION) at 07:52

## 2022-04-20 NOTE — DISCHARGE PLANNING
Agency/Facility Name: [facility]  Spoke To: Glo  Outcome: Per Glo, referral not received. Correct fax number given, [phone]. DPA to fax referral to correct fax number.    [phone]  Agency/Facility Name: [facility]   Spoke To: Glo  Outcome: Per Glo, referral is under review at this time.

## 2022-04-20 NOTE — CARE PLAN
The patient is Watcher - Medium risk of patient condition declining or worsening    Shift Goals  Clinical Goals: wean O2, ambulate  Patient Goals: Go home    Progress made toward(s) clinical / shift goals:    Pt is currently on 4L with an oxy mask. Pt completed walking test today. Referrals were placed for pts Home health of choice. Will continue to monitor pt O2 needs.     Problem: Knowledge Deficit - Standard  Goal: Patient and family/care givers will demonstrate understanding of plan of care, disease process/condition, diagnostic tests and medications  Outcome: Progressing     Problem: Hemodynamics  Goal: Patient's hemodynamics, fluid balance and neurologic status will be stable or improve  Outcome: Progressing     Problem: Respiratory  Goal: Patient will achieve/maintain optimum respiratory ventilation and gas exchange  Outcome: Progressing       Patient is not progressing towards the following goals:

## 2022-04-20 NOTE — PROGRESS NOTES
This RN called scheduling, no answer. RN left a voicemail to have a cardiology follow up appointment- per MD Garcia.

## 2022-04-20 NOTE — CARE PLAN
Problem: Knowledge Deficit - Standard  Goal: Patient and family/care givers will demonstrate understanding of plan of care, disease process/condition, diagnostic tests and medications  Outcome: Progressing     Problem: Hemodynamics  Goal: Patient's hemodynamics, fluid balance and neurologic status will be stable or improve  Outcome: Progressing     Problem: Respiratory  Goal: Patient will achieve/maintain optimum respiratory ventilation and gas exchange  Outcome: Progressing     Problem: Physical Regulation  Goal: Diagnostic test results will improve  Outcome: Progressing   The patient is Watcher - Medium risk of patient condition declining or worsening    Shift Goals  Clinical Goals: IS use  Patient Goals: go home, rest    Progress made toward(s) clinical / shift goals:  Pt encouraged to use IS.     Patient is not progressing towards the following goals:

## 2022-04-20 NOTE — PROGRESS NOTES
Assumed pt care. AOx4. Denies any pain at this time.  Denies any other distress.  Discussed POC for possible discharge today.  Pt verbalized understanding.  Hourly rounding in place. Fall precautions in place and call lights w/in reach.

## 2022-04-20 NOTE — PROGRESS NOTES
Telemetry Shift Summary    Rhythm SR  HR Range 68-95  Ectopy O/F PVC, R bigem, trigem, couplet  Measurements 0.20/0.08/0.36        Normal Values  Rhythm SR  HR Range    Measurements 0.12-0.20 / 0.06-0.10  / 0.30-0.52

## 2022-04-20 NOTE — DISCHARGE PLANNING
Anticipated discharge disposition: home with home O2 and HH     Action: made phone call to jaqui at [facility], per Jaqui [facility]   Sent teams message to [facility] to resend referral   Barriers to discharge: HH and oxygen key     Plan: will continue to follow for DC needs.

## 2022-04-20 NOTE — DISCHARGE PLANNING
Anticipated Discharge Disposition: Home with HH and Oxygen      Action: CM RN received a call from Olga from the [facility]. Received order from Olga GAONA stating pt already on service and will update order for updated liter flow. Per Olga oked to give pt BNB tanks left in Case management office from B.     Barriers to Discharge: Medical Clearance     Plan: Case management to follow up with delivery of oxygen tanks to pts room

## 2022-04-20 NOTE — DISCHARGE INSTRUCTIONS
Discharge Instructions    Discharged to home by car with relative. Discharged via wheelchair, hospital escort: Yes.  Special equipment needed: Oxygen    Be sure to schedule a follow-up appointment with your primary care doctor or any specialists as instructed.     Discharge Plan:   Diet Plan: Discussed  Activity Level: Discussed  Confirmed Follow up Appointment: Appointment Scheduled  Confirmed Symptoms Management: Discussed  Medication Reconciliation Updated: Yes    I understand that a diet low in cholesterol, fat, and sodium is recommended for good health. Unless I have been given specific instructions below for another diet, I accept this instruction as my diet prescription.   Other diet: Cardiac    Special Instructions: None    · Is patient discharged on Warfarin / Coumadin?   No       Acute Respiratory Failure, Adult    Acute respiratory failure occurs when there is not enough oxygen passing from your lungs to your body. When this happens, your lungs have trouble removing carbon dioxide from the blood. This causes your blood oxygen level to drop too low as carbon dioxide builds up.  Acute respiratory failure is a medical emergency. It can develop quickly, but it is temporary if treated promptly. Your lung capacity, or how much air your lungs can hold, may improve with time, exercise, and treatment.  What are the causes?  There are many possible causes of acute respiratory failure, including:  · Lung injury.  · Chest injury or damage to the ribs or tissues near the lungs.  · Lung conditions that affect the flow of air and blood into and out of the lungs, such as pneumonia, acute respiratory distress syndrome, and cystic fibrosis.  · Medical conditions, such as strokes or spinal cord injuries, that affect the muscles and nerves that control breathing.  · Blood infection (sepsis).  · Inflammation of the pancreas (pancreatitis).  · A blood clot in the lungs (pulmonary embolism).  · A large-volume blood  transfusion.  · Burns.  · Near-drowning.  · Seizure.  · Smoke inhalation.  · Reaction to medicines.  · Alcohol or drug overdose.  What increases the risk?  This condition is more likely to develop in people who have:  · A blocked airway.  · Asthma.  · A condition or disease that damages or weakens the muscles, nerves, bones, or tissues that are involved in breathing.  · A serious infection.  · A health problem that blocks the unconscious reflex that is involved in breathing, such as hypothyroidism or sleep apnea.  · A lung injury or trauma.  What are the signs or symptoms?  Trouble breathing is the main symptom of acute respiratory failure. Symptoms may also include:  · Rapid breathing.  · Restlessness or anxiety.  · Skin, lips, or fingernails that appear blue (cyanosis).  · Rapid heart rate.  · Abnormal heart rhythms (arrhythmias).  · Confusion or changes in behavior.  · Tiredness or loss of energy.  · Feeling sleepy or having a loss of consciousness.  How is this diagnosed?  Your health care provider can diagnose acute respiratory failure with a medical history and physical exam. During the exam, your health care provider will listen to your heart and check for crackling or wheezing sounds in your lungs. Your may also have tests to confirm the diagnosis and determine what is causing respiratory failure. These tests may include:  · Measuring the amount of oxygen in your blood (pulse oximetry). The measurement comes from a small device that is placed on your finger, earlobe, or toe.  · Other blood tests to measure blood gases and to look for signs of infection.  · Sampling your cerebral spinal fluid or tracheal fluid to check for infections.  · Chest X-ray to look for fluid in spaces that should be filled with air.  · Electrocardiogram (ECG) to look at the heart's electrical activity.  How is this treated?  Treatment for this condition usually takes places in a hospital intensive care unit (ICU). Treatment depends  on what is causing the condition. It may include one or more treatments until your symptoms improve. Treatment may include:  · Supplemental oxygen. Extra oxygen is given through a tube in the nose, a face mask, or a jung.  · A device such as a continuous positive airway pressure (CPAP) or bi-level positive airway pressure (BiPAP or BPAP) machine. This treatment uses mild air pressure to keep the airways open. A mask or other device will be placed over your nose or mouth. A tube that is connected to a motor will deliver oxygen through the mask.  · Ventilator. This treatment helps move air into and out of the lungs. This may be done with a bag and mask or a machine. For this treatment, a tube is placed in your windpipe (trachea) so air and oxygen can flow to the lungs.  · Extracorporeal membrane oxygenation (ECMO). This treatment temporarily takes over the function of the heart and lungs, supplying oxygen and removing carbon dioxide. ECMO gives the lungs a chance to recover. It may be used if a ventilator is not effective.  · Tracheostomy. This is a procedure that creates a hole in the neck to insert a breathing tube.  · Receiving fluids and medicines.  · Rocking the bed to help breathing.  Follow these instructions at home:  · Take over-the-counter and prescription medicines only as told by your health care provider.  · Return to normal activities as told by your health care provider. Ask your health care provider what activities are safe for you.  · Keep all follow-up visits as told by your health care provider. This is important.  How is this prevented?  Treating infections and medical conditions that may lead to acute respiratory failure can help prevent the condition from developing.  Contact a health care provider if:  · You have a fever.  · Your symptoms do not improve or they get worse.  Get help right away if:  · You are having trouble breathing.  · You lose consciousness.  · Your have cyanosis or turn  blue.  · You develop a rapid heart rate.  · You are confused.  These symptoms may represent a serious problem that is an emergency. Do not wait to see if the symptoms will go away. Get medical help right away. Call your local emergency services (911 in the [place] Do not drive yourself to the hospital.  This information is not intended to replace advice given to you by your health care provider. Make sure you discuss any questions you have with your health care provider.  Document Released: 12/23/2014 Document Revised: 11/30/2018 Document Reviewed: 07/05/2017  [employer] Patient Education © 2020 [employer]      Syncope  Syncope is when you pass out (faint) for a short time. It is caused by a sudden decrease in blood flow to the brain. Signs that you may be about to pass out include:  · Feeling dizzy or light-headed.  · Feeling sick to your stomach (nauseous).  · Seeing all white or all black.  · Having cold, clammy skin.  If you pass out, get help right away. Call your local emergency services (911 in the [place] Do not drive yourself to the hospital.  Follow these instructions at home:  Watch for any changes in your symptoms. Take these actions to stay safe and help with your symptoms:  Lifestyle  · Do not drive, use machinery, or play sports until your doctor says it is okay.  · Do not drink alcohol.  · Do not use any products that contain nicotine or tobacco, such as cigarettes and e-cigarettes. If you need help quitting, ask your doctor.  · Drink enough fluid to keep your pee (urine) pale yellow.  General instructions  · Take over-the-counter and prescription medicines only as told by your doctor.  · If you are taking blood pressure or heart medicine, sit up and stand up slowly. Spend a few minutes getting ready to sit and then stand. This can help you feel less dizzy.  · Have someone stay with you until you feel stable.  · If you start to feel like you might pass out, lie down right away and raise (elevate) your  feet above the level of your heart. Breathe deeply and steadily. Wait until all of the symptoms are gone.  · Keep all follow-up visits as told by your doctor. This is important.  Get help right away if:  · You have a very bad headache.  · You pass out once or more than once.  · You have pain in your chest, belly, or back.  · You have a very fast or uneven heartbeat (palpitations).  · It hurts to breathe.  · You are bleeding from your mouth or your bottom (rectum).  · You have black or tarry poop (stool).  · You have jerky movements that you cannot control (seizure).  · You are confused.  · You have trouble walking.  · You are very weak.  · You have vision problems.  These symptoms may be an emergency. Do not wait to see if the symptoms will go away. Get medical help right away. Call your local emergency services (911 in the [place] Do not drive yourself to the hospital.  Summary  · Syncope is when you pass out (faint) for a short time. It is caused by a sudden decrease in blood flow to the brain.  · Signs that you may be about to faint include feeling dizzy, light-headed, or sick to your stomach, seeing all white or all black, or having cold, clammy skin.  · If you start to feel like you might pass out, lie down right away and raise (elevate) your feet above the level of your heart. Breathe deeply and steadily. Wait until all of the symptoms are gone.  This information is not intended to replace advice given to you by your health care provider. Make sure you discuss any questions you have with your health care provider.  Document Released: 06/05/2009 Document Revised: 01/30/2019 Document Reviewed: 01/30/2019  [employer] Patient Education © 2020 [employer]    COVID-19  COVID-19 is a respiratory infection that is caused by a virus called severe acute respiratory syndrome coronavirus 2 (SARS-CoV-2). The disease is also known as coronavirus disease or novel coronavirus. In some people, the virus may not cause any  symptoms. In others, it may cause a serious infection. The infection can get worse quickly and can lead to complications, such as:  · Pneumonia, or infection of the lungs.  · Acute respiratory distress syndrome or ARDS. This is fluid build-up in the lungs.  · Acute respiratory failure. This is a condition in which there is not enough oxygen passing from the lungs to the body.  · Sepsis or septic shock. This is a serious bodily reaction to an infection.  · Blood clotting problems.  · Secondary infections due to bacteria or fungus.  The virus that causes COVID-19 is contagious. This means that it can spread from person to person through droplets from coughs and sneezes (respiratory secretions).  What are the causes?  This illness is caused by a virus. You may catch the virus by:  · Breathing in droplets from an infected person's cough or sneeze.  · Touching something, like a table or a doorknob, that was exposed to the virus (contaminated) and then touching your mouth, nose, or eyes.  What increases the risk?  Risk for infection  You are more likely to be infected with this virus if you:  · Live in or travel to an area with a COVID-19 outbreak.  · Come in contact with a sick person who recently traveled to an area with a COVID-19 outbreak.  · Provide care for or live with a person who is infected with COVID-19.  Risk for serious illness  You are more likely to become seriously ill from the virus if you:  · Are 65 years of age or older.  · Have a long-term disease that lowers your body's ability to fight infection (immunocompromised).  · Live in a nursing home or long-term care facility.  · Have a long-term (chronic) disease such as:  ? Chronic lung disease, including chronic obstructive pulmonary disease or asthma  ? Heart disease.  ? Diabetes.  ? Chronic kidney disease.  ? Liver disease.  · Are obese.  What are the signs or symptoms?  Symptoms of this condition can range from mild to severe. Symptoms may appear any  time from 2 to 14 days after being exposed to the virus. They include:  · A fever.  · A cough.  · Difficulty breathing.  · Chills.  · Muscle pains.  · A sore throat.  · Loss of taste or smell.  Some people may also have stomach problems, such as nausea, vomiting, or diarrhea.  Other people may not have any symptoms of COVID-19.  How is this diagnosed?  This condition may be diagnosed based on:  · Your signs and symptoms, especially if:  ? You live in an area with a COVID-19 outbreak.  ? You recently traveled to or from an area where the virus is common.  ? You provide care for or live with a person who was diagnosed with COVID-19.  · A physical exam.  · Lab tests, which may include:  ? A nasal swab to take a sample of fluid from your nose.  ? A throat swab to take a sample of fluid from your throat.  ? A sample of mucus from your lungs (sputum).  ? Blood tests.  · Imaging tests, which may include, X-rays, CT scan, or ultrasound.  How is this treated?  At present, there is no medicine to treat COVID-19. Medicines that treat other diseases are being used on a trial basis to see if they are effective against COVID-19.  Your health care provider will talk with you about ways to treat your symptoms. For most people, the infection is mild and can be managed at home with rest, fluids, and over-the-counter medicines.  Treatment for a serious infection usually takes places in a hospital intensive care unit (ICU). It may include one or more of the following treatments. These treatments are given until your symptoms improve.  · Receiving fluids and medicines through an IV.  · Supplemental oxygen. Extra oxygen is given through a tube in the nose, a face mask, or a jugn.  · Positioning you to lie on your stomach (prone position). This makes it easier for oxygen to get into the lungs.  · Continuous positive airway pressure (CPAP) or bi-level positive airway pressure (BPAP) machine. This treatment uses mild air pressure to keep  the airways open. A tube that is connected to a motor delivers oxygen to the body.  · Ventilator. This treatment moves air into and out of the lungs by using a tube that is placed in your windpipe.  · Tracheostomy. This is a procedure to create a hole in the neck so that a breathing tube can be inserted.  · Extracorporeal membrane oxygenation (ECMO). This procedure gives the lungs a chance to recover by taking over the functions of the heart and lungs. It supplies oxygen to the body and removes carbon dioxide.  Follow these instructions at home:  Lifestyle  · If you are sick, stay home except to get medical care. Your health care provider will tell you how long to stay home. Call your health care provider before you go for medical care.  · Rest at home as told by your health care provider.  · Do not use any products that contain nicotine or tobacco, such as cigarettes, e-cigarettes, and chewing tobacco. If you need help quitting, ask your health care provider.  · Return to your normal activities as told by your health care provider. Ask your health care provider what activities are safe for you.  General instructions  · Take over-the-counter and prescription medicines only as told by your health care provider.  · Drink enough fluid to keep your urine pale yellow.  · Keep all follow-up visits as told by your health care provider. This is important.  How is this prevented?    There is no vaccine to help prevent COVID-19 infection. However, there are steps you can take to protect yourself and others from this virus.  To protect yourself:   · Do not travel to areas where COVID-19 is a risk. The areas where COVID-19 is reported change often. To identify high-risk areas and travel restrictions, check the CDC travel website: wwwnc.cdc.gov/travel/notices  · If you live in, or must travel to, an area where COVID-19 is a risk, take precautions to avoid infection.  ? Stay away from people who are sick.  ? Wash your hands often  with soap and water for 20 seconds. If soap and water are not available, use an alcohol-based hand .  ? Avoid touching your mouth, face, eyes, or nose.  ? Avoid going out in public, follow guidance from your state and local health authorities.  ? If you must go out in public, wear a cloth face covering or face mask.  ? Disinfect objects and surfaces that are frequently touched every day. This may include:  § Counters and tables.  § Doorknobs and light switches.  § Sinks and faucets.  § Electronics, such as phones, remote controls, keyboards, computers, and tablets.  To protect others:  If you have symptoms of COVID-19, take steps to prevent the virus from spreading to others.  · If you think you have a COVID-19 infection, contact your health care provider right away. Tell your health care team that you think you may have a COVID-19 infection.  · Stay home. Leave your house only to seek medical care. Do not use public transport.  · Do not travel while you are sick.  · Wash your hands often with soap and water for 20 seconds. If soap and water are not available, use alcohol-based hand .  · Stay away from other members of your household. Let healthy household members care for children and pets, if possible. If you have to care for children or pets, wash your hands often and wear a mask. If possible, stay in your own room, separate from others. Use a different bathroom.  · Make sure that all people in your household wash their hands well and often.  · Cough or sneeze into a tissue or your sleeve or elbow. Do not cough or sneeze into your hand or into the air.  · Wear a cloth face covering or face mask.  Where to find more information  · Centers for Disease Control and Prevention: www.cdc.gov/coronavirus/2019-ncov/index.html  · World Health Organization: www.who.int/health-topics/coronavirus  Contact a health care provider if:  · You live in or have traveled to an area where COVID-19 is a risk and you  have symptoms of the infection.  · You have had contact with someone who has COVID-19 and you have symptoms of the infection.  Get help right away if:  · You have trouble breathing.  · You have pain or pressure in your chest.  · You have confusion.  · You have bluish lips and fingernails.  · You have difficulty waking from sleep.  · You have symptoms that get worse.  These symptoms may represent a serious problem that is an emergency. Do not wait to see if the symptoms will go away. Get medical help right away. Call your local emergency services (911 in the [place] Do not drive yourself to the hospital. Let the emergency medical personnel know if you think you have COVID-19.  Summary  · COVID-19 is a respiratory infection that is caused by a virus. It is also known as coronavirus disease or novel coronavirus. It can cause serious infections, such as pneumonia, acute respiratory distress syndrome, acute respiratory failure, or sepsis.  · The virus that causes COVID-19 is contagious. This means that it can spread from person to person through droplets from coughs and sneezes.  · You are more likely to develop a serious illness if you are 65 years of age or older, have a weak immunity, live in a nursing home, or have chronic disease.  · There is no medicine to treat COVID-19. Your health care provider will talk with you about ways to treat your symptoms.  · Take steps to protect yourself and others from infection. Wash your hands often and disinfect objects and surfaces that are frequently touched every day. Stay away from people who are sick and wear a mask if you are sick.  This information is not intended to replace advice given to you by your health care provider. Make sure you discuss any questions you have with your health care provider.  Document Released: 01/23/2020 Document Revised: 05/14/2020 Document Reviewed: 01/23/2020  [employer] Patient Education © 2020 [employer]    Depression / Suicide Risk    As you  are discharged from this [facility] facility, it is important to learn how to keep safe from harming yourself.    Recognize the warning signs:  · Abrupt changes in personality, positive or negative- including increase in energy   · Giving away possessions  · Change in eating patterns- significant weight changes-  positive or negative  · Change in sleeping patterns- unable to sleep or sleeping all the time   · Unwillingness or inability to communicate  · Depression  · Unusual sadness, discouragement and loneliness  · Talk of wanting to die  · Neglect of personal appearance   · Rebelliousness- reckless behavior  · Withdrawal from people/activities they love  · Confusion- inability to concentrate     If you or a loved one observes any of these behaviors or has concerns about self-harm, here's what you can do:  · Talk about it- your feelings and reasons for harming yourself  · Remove any means that you might use to hurt yourself (examples: pills, rope, extension cords, firearm)  · Get professional help from the community (Mental Health, Substance Abuse, psychological counseling)  · Do not be alone:Call your Safe Contact- someone whom you trust who will be there for you.  · Call your local CRISIS HOTLINE 536-3065 or 777-147-0743  · Call your local Children's Mobile Crisis Response Team [place] (550) 286-0608 or www.[employer]  · Call the toll free National Suicide Prevention Hotlines   · National Suicide Prevention Lifeline 985-085-DVFM (8212)  · National Hope Line Network 800-SUICIDE (873-4529)

## 2022-04-20 NOTE — CARE PLAN
The patient is Stable - Low risk of patient condition declining or worsening    Shift Goals  Clinical Goals: Discharge, improve O2  Patient Goals: Go home    Progress made toward(s) clinical / shift goals:      Pt is planned for discharge today, waiting for O2 delivery and approval for home health. Pt vitals are stable at this point and RN to continue monitoring O2.     Problem: Knowledge Deficit - Standard  Goal: Patient and family/care givers will demonstrate understanding of plan of care, disease process/condition, diagnostic tests and medications  Outcome: Met     Problem: Hemodynamics  Goal: Patient's hemodynamics, fluid balance and neurologic status will be stable or improve  Outcome: Met     Problem: Fluid Volume  Goal: Fluid volume balance will be maintained  Outcome: Met     Problem: Urinary - Renal Perfusion  Goal: Ability to achieve and maintain adequate renal perfusion and functioning will improve  Outcome: Met     Problem: Respiratory  Goal: Patient will achieve/maintain optimum respiratory ventilation and gas exchange  Outcome: Met     Problem: Mechanical Ventilation  Goal: Safe management of artificial airway and ventilation  Outcome: Met  Goal: Successful weaning off mechanical ventilator, spontaneously maintains adequate gas exchange  Outcome: Met  Goal: Patient will be able to express needs and understand communication  Outcome: Met     Problem: Physical Regulation  Goal: Diagnostic test results will improve  Outcome: Met  Goal: Signs and symptoms of infection will decrease  Outcome: Met     Problem: Pain - Standard  Goal: Alleviation of pain or a reduction in pain to the patient’s comfort goal  Outcome: Met     Problem: Fall Risk  Goal: Patient will remain free from falls  Outcome: Met       Patient is not progressing towards the following goals:

## 2022-04-21 NOTE — PROGRESS NOTES
Monitor Summary     Rhythm:SR   Measurements: 0.20/0.08/0.38  ECTOPIES: oPVC, rBIG, rTRIG, rPAC, rCOUP        Normal Values  Rhythm SR  HR Range    Measurements 0.12-0.20 / 0.06-0.10  / 0.30-0.52

## 2022-04-21 NOTE — TELEPHONE ENCOUNTER
RPM Notification: Welcome Call  Actions: None    Total time (minutes) spent communicating with patient: 3    Patient is a new start to the program

## 2022-04-22 ENCOUNTER — TELEPHONE (OUTPATIENT)
Dept: OTHER | Facility: MEDICAL CENTER | Age: 72
End: 2022-04-22
Payer: COMMERCIAL

## 2022-04-22 VITALS — HEART RATE: 112 BPM | OXYGEN SATURATION: 94 % | RESPIRATION RATE: 16 BRPM

## 2022-04-23 ENCOUNTER — TELEPHONE (OUTPATIENT)
Dept: OTHER | Facility: MEDICAL CENTER | Age: 72
End: 2022-04-23
Payer: COMMERCIAL

## 2022-04-23 VITALS — OXYGEN SATURATION: 84 % | HEART RATE: 115 BPM | RESPIRATION RATE: 21 BRPM

## 2022-04-23 VITALS — RESPIRATION RATE: 23 BRPM | HEART RATE: 124 BPM | OXYGEN SATURATION: 92 %

## 2022-04-23 NOTE — TELEPHONE ENCOUNTER
RPM Notification: Disconnect and High Alert  Actions: None    Total time (minutes) spent communicating with patient: 2    At 1050 Disconnect. Pt called to inform us that he was going to shower. During our call at he alerted with SpO2 84%. Asked him he was feeling okay. Pt advised me yes he is feeling fine. Pt oxygen returned back up to normal range, SpO2 91% before disconnecting.

## 2022-04-23 NOTE — TELEPHONE ENCOUNTER
RPM Notification: Disconnect and Reconnect  Actions: Device Troubleshoot    Total time (minutes) spent communicating with patient: 2    At 1709 Disconnect. Pt called to inform us that he changed out his wristband and can not seem to reconnect back on the monitor. I asked pt and his wife what color light was currently on the monitor. They both stated blue. I asked if they could go into the [employer] Yumiko and walked them through steps to the [facility]. Advised wife to make sure it was sharing to [facility]. She stated she clicked on it and now Masimo his stating hit to resume. Pt stated [employer] is stating sensor is defective but has a solid blue light on monitor. After a minute. She stated never mind they can now see his data and is now working. At 1712 Reconnect. Pt was disconnected for about 3 minutes.

## 2022-04-24 ENCOUNTER — TELEPHONE (OUTPATIENT)
Dept: OTHER | Facility: MEDICAL CENTER | Age: 72
End: 2022-04-24
Payer: COMMERCIAL

## 2022-04-24 VITALS — OXYGEN SATURATION: 90 % | HEART RATE: 104 BPM | RESPIRATION RATE: 29 BRPM

## 2022-04-24 NOTE — TELEPHONE ENCOUNTER
RPM Notification: High Alert  Actions: None    Total time (minutes) spent communicating with patient: 3    At  0810 low SPo2 alert of 81%. Contacted patient. Patient states he is doing well just walking to the bathroom. Patient SPo2 increased to 90%.

## 2022-04-25 ENCOUNTER — TELEPHONE (OUTPATIENT)
Dept: OTHER | Facility: MEDICAL CENTER | Age: 72
End: 2022-04-25
Payer: COMMERCIAL

## 2022-04-25 VITALS — HEART RATE: 92 BPM | RESPIRATION RATE: 23 BRPM | OXYGEN SATURATION: 82 %

## 2022-04-25 NOTE — TELEPHONE ENCOUNTER
RPM Notification: High Alert 82%  Actions: None    Called patient, he states he is doing well. He advised he got up to go to the bathroom and did not bring his o2 with him. He came up to 91% before ending the call.     Total time (minutes) spent communicating with patient: 2

## 2022-04-26 ENCOUNTER — TELEPHONE (OUTPATIENT)
Dept: OTHER | Facility: MEDICAL CENTER | Age: 72
End: 2022-04-26

## 2022-04-26 VITALS — OXYGEN SATURATION: 80 % | HEART RATE: 96 BPM | RESPIRATION RATE: 14 BRPM

## 2022-04-26 VITALS — HEART RATE: 117 BPM | OXYGEN SATURATION: 84 % | RESPIRATION RATE: 35 BRPM

## 2022-04-26 NOTE — TELEPHONE ENCOUNTER
RPM Notification: High Alert  Actions: None    Total time (minutes) spent communicating with patient: 2    At 1628 alert SpO2 80%. Contacted pt. Pt advised me that he is doing just fine. He apologized stated that he has been up moving around, but his oxygen will get back up. At 1633 SpO2 93%.

## 2022-04-27 ENCOUNTER — TELEPHONE (OUTPATIENT)
Dept: OTHER | Facility: MEDICAL CENTER | Age: 72
End: 2022-04-27
Payer: COMMERCIAL

## 2022-04-27 VITALS — OXYGEN SATURATION: 88 % | HEART RATE: 107 BPM | RESPIRATION RATE: 23 BRPM

## 2022-04-27 NOTE — TELEPHONE ENCOUNTER
RPM Notification: High Alert  Actions: None    Called patient, he states he is doing well. He advised he is trying to move around more and was probably moving too fast which caused it to alert. He did come back up to 93%.     Total time (minutes) spent communicating with patient: 2

## 2022-04-28 ENCOUNTER — TELEPHONE (OUTPATIENT)
Dept: OTHER | Facility: MEDICAL CENTER | Age: 72
End: 2022-04-28
Payer: COMMERCIAL

## 2022-04-28 VITALS — HEART RATE: 104 BPM | OXYGEN SATURATION: 84 % | RESPIRATION RATE: 20 BRPM

## 2022-04-28 NOTE — TELEPHONE ENCOUNTER
251  Called patient due to his Sp02  dropping down to 83%. Patient stated to be moving around but, is feeling well. Stated to be on 6L of oxygen, and having his mask in place.    2352   Sp02 91%      R 23

## 2022-04-28 NOTE — TELEPHONE ENCOUNTER
RPM Notification: High Alert  Actions: None    Total time (minutes) spent communicating with patient: 1    At 1542 alert SpO2 84%. Contacted pt. Pt stated he was expecting our call and that he is fine. He advised me that he has been up and moving around being active. He advised me he is wearing his oxygen and it is on 6L. I advised him to take some slow deep breaths when he can to help his oxygen. He advised me he would. At 1545 SpO2 91%.

## 2022-05-01 ENCOUNTER — TELEPHONE (OUTPATIENT)
Dept: OTHER | Facility: MEDICAL CENTER | Age: 72
End: 2022-05-01
Payer: COMMERCIAL

## 2022-05-01 VITALS — HEART RATE: 96 BPM | OXYGEN SATURATION: 89 % | RESPIRATION RATE: 25 BRPM

## 2022-05-01 NOTE — TELEPHONE ENCOUNTER
Patient's COPD is uncontrolled due to worsening of baseline hypoxia currently.  Patient is currently off COPD Pathway. Continue scheduled inhalers Steroids, Antibiotics and Supplemental oxygen and monitor respiratory status closely.  Continue steroids.       RPM Notification: High Alert and Patient Communication  Actions: None    Total time (minutes) spent communicating with patient: 5    Alerted at 83% 1057. Called Juan José. He stated he was moving around to fast, and could feel his oxygen go down. I asked if he moved his oxygen up to 8L when up and moving around. He stated he didn't,  he kept it at 6L. I reminded him if  He is up moving around to move it up, it will help with his oxygen not dropping. He has an order to move up to 8L ambulating.

## 2022-05-03 ENCOUNTER — TELEPHONE (OUTPATIENT)
Dept: OTHER | Facility: MEDICAL CENTER | Age: 72
End: 2022-05-03
Payer: COMMERCIAL

## 2022-05-03 VITALS — OXYGEN SATURATION: 92 % | HEART RATE: 115 BPM | RESPIRATION RATE: 23 BRPM

## 2022-05-03 VITALS — RESPIRATION RATE: 45 BRPM | OXYGEN SATURATION: 90 % | HEART RATE: 124 BPM

## 2022-05-03 VITALS — OXYGEN SATURATION: 97 % | HEART RATE: 97 BPM

## 2022-05-03 NOTE — TELEPHONE ENCOUNTER
RPM Notification: Disconnect  Actions: None    Total time (minutes) spent communicating with patient: 1    At 1118 Disconnect. Pt called to inform us that he was going to disconnect from the monitor to shower. He stated he will reconnect once finished.

## 2022-05-03 NOTE — TELEPHONE ENCOUNTER
RPM Notification: Disconnect and Reconnect  Actions: Device Troubleshoot    Total time (minutes) spent communicating with patient: 3    At 1539 Disconnect. Contacted pt. Pt advised that he is fine. I let him know we were calling due to him being disconnected from monitor. Pt looked and stated that his monitor had a solid blue light on. He stated that he can try restarting his phone. I asked pt to make sure his [employer] is logged into and working, if that is good then go to the [facility] and make sure data is sharing to [facility]. If both those are good then try restarting phone turn it off and back on. Pt stated he would do that.   At 1558 Still Disconnected. Contacted pt to double check if he had checked everything and tried getting reconnected for us. Pt advised me that everything is working fine on his end and can see his data. He advised me he had turn his phone off and on. I asked him to make sure bluetooth is connected. Pt advised me that he will work on it. I stated if he still remains disconnected I'll call back for more troubleshooting and that if I'm unsuccessful I might have to reach out to [employer].   At 1601 Reconnect. Pt was able to get reconnected back to monitor. Pt called back asking if we could see his data yet. I stated yes and that whatever he did worked.

## 2022-05-03 NOTE — TELEPHONE ENCOUNTER
RPM Notification: Reconnect  Actions: None    Total time (minutes) spent communicating with patient: 0    At 1148 Reconnect. Pt reconnected back to monitor. Pt was disconnected for about 30 minutes to shower.

## 2022-05-07 ENCOUNTER — TELEPHONE (OUTPATIENT)
Dept: OTHER | Facility: MEDICAL CENTER | Age: 72
End: 2022-05-07
Payer: COMMERCIAL

## 2022-05-07 VITALS — OXYGEN SATURATION: 95 % | HEART RATE: 100 BPM | RESPIRATION RATE: 22 BRPM

## 2022-05-07 VITALS — HEART RATE: 97 BPM | RESPIRATION RATE: 16 BRPM | OXYGEN SATURATION: 91 %

## 2022-05-07 NOTE — TELEPHONE ENCOUNTER
RPM Notification: Disconnect  Actions: None    Total time (minutes) spent communicating with patient: 1    At 1610 Disconnect. Pt called to inform us that he was going to disconnect from the monitor to shower, and also replace the wristband once he is done. Pt will reconnect once finished.

## 2022-05-08 ENCOUNTER — TELEPHONE (OUTPATIENT)
Dept: OTHER | Facility: MEDICAL CENTER | Age: 72
End: 2022-05-08
Payer: COMMERCIAL

## 2022-05-08 VITALS — HEART RATE: 88 BPM | OXYGEN SATURATION: 93 % | RESPIRATION RATE: 19 BRPM

## 2022-05-08 NOTE — TELEPHONE ENCOUNTER
RPM Notification: Disconnect  Actions: None    Total time (minutes) spent communicating with patient: 7    Contacted pt because he was not showing up in the dashboard. Pt stated he was doing well and was sleeping. I asked him to open up his [employer] garfield and pt did. Under sharing it did not have [facility] there. I sent him an invite and he received it. I had pt click on it and he clicked through all the things but he was still not showing in our dashboard. Under sharing he did not show sharing with [facility]. I resent him the invite and it was the same thing. Pt stated he was still a bit sleepy because I had woken him up. I asked pt what time was he normally up. He stated about 8:30-9. I told him I would let him rest and I would call him back once he was fully awake to troubleshoot and get him reconnected.

## 2022-05-08 NOTE — TELEPHONE ENCOUNTER
RPM Notification: Reconnect  Actions: None    Total time (minutes) spent communicating with patient: 3    Pt was able to reconnect from the link I sent to him. He reconnected at 0643 w/ SpO2 of 93%.

## 2022-05-08 NOTE — TELEPHONE ENCOUNTER
RPM Notification: Reconnect  Actions: None    Total time (minutes) spent communicating with patient: 0    At 1702 Reconnect. Pt reconnected back to monitor. Pt was disconnect earlier to shower and change out wristband. Pt was disconnected for about 52 minutes.

## 2022-05-10 ENCOUNTER — TELEPHONE (OUTPATIENT)
Dept: OTHER | Facility: MEDICAL CENTER | Age: 72
End: 2022-05-10
Payer: COMMERCIAL

## 2022-05-10 VITALS — OXYGEN SATURATION: 97 % | RESPIRATION RATE: 16 BRPM | HEART RATE: 84 BPM

## 2022-05-10 VITALS — RESPIRATION RATE: 23 BRPM | HEART RATE: 107 BPM | OXYGEN SATURATION: 82 %

## 2022-05-10 NOTE — TELEPHONE ENCOUNTER
RPM Notification: Patient Communication  Actions: None    Total time (minutes) spent communicating with patient: 4    Patient called to let us know he is changing phone so he will be disconnecting for a bit. Pt stated he would call me back once it is all changed to let us know so that we can reconnect him to [employer]. Pt stated he new his password to [employer] and he would be keeping the same number he just upgraded his phone.

## 2022-05-10 NOTE — TELEPHONE ENCOUNTER
RPM Notification: Reconnect  Actions: Contacted DME Company    Total time (minutes) spent communicating with patient: 10    Patient called telling me he was ready to reconnect. I sent pt invitation and he accepted but there was no numbers for us to see. I attempted to troubleshoot pt by trying to reconnect bluetooth but was unsuccessfully. I contacted [employer] support and spoke with maryellen.     Maryellen contacted pt and was able to reconnect him. He reconnected w/ SpO2 of 97%.

## 2022-05-11 NOTE — TELEPHONE ENCOUNTER
RPM Notification: High Alert  Actions: None    Total time (minutes) spent communicating with patient: 2      Called patient, he states he is doing well. He advises he was moving around too much and didn't realize his o2 dropped so low until his phone alerted him. He says he is going to relax for a moment to let his o2 come back up.

## 2022-05-12 ENCOUNTER — TELEPHONE (OUTPATIENT)
Dept: OTHER | Facility: MEDICAL CENTER | Age: 72
End: 2022-05-12
Payer: COMMERCIAL

## 2022-05-12 VITALS — HEART RATE: 113 BPM | RESPIRATION RATE: 17 BRPM | OXYGEN SATURATION: 94 %

## 2022-05-12 VITALS — OXYGEN SATURATION: 84 % | HEART RATE: 130 BPM | RESPIRATION RATE: 18 BRPM

## 2022-05-12 VITALS — OXYGEN SATURATION: 91 % | RESPIRATION RATE: 16 BRPM | HEART RATE: 99 BPM

## 2022-05-12 NOTE — TELEPHONE ENCOUNTER
RPM Notification: Disconnect  Actions: None    Total time (minutes) spent communicating with patient: 1    At 1126 Disconnect. Pt called to inform us that he was going to disconnect to shower and will reconnect once finished.

## 2022-05-12 NOTE — TELEPHONE ENCOUNTER
RPM Notification: Reconnect  Actions: None    Total time (minutes) spent communicating with patient: 0    At 1148 Reconnect. Pt reconnected back to monitor, after being disconnected to shower. Pt was disconnected for about 22 minutes.

## 2022-05-13 ENCOUNTER — TELEPHONE (OUTPATIENT)
Dept: OTHER | Facility: MEDICAL CENTER | Age: 72
End: 2022-05-13

## 2022-05-13 ENCOUNTER — TELEPHONE (OUTPATIENT)
Dept: OTHER | Facility: MEDICAL CENTER | Age: 72
End: 2022-05-13
Payer: COMMERCIAL

## 2022-05-13 VITALS — OXYGEN SATURATION: 81 % | HEART RATE: 96 BPM

## 2022-05-13 VITALS — OXYGEN SATURATION: 87 % | HEART RATE: 105 BPM

## 2022-05-13 NOTE — TELEPHONE ENCOUNTER
RPM Notification: Medium Alert  Actions: None    Total time (minutes) spent communicating with patient: 0    At 0919 alert SpO2 87%. Did not contact pt since a minute later pt oxygen returned back up to normal range. At 0920 SpO2 90%. At 0922 SpO2 95%.

## 2022-05-13 NOTE — TELEPHONE ENCOUNTER
RPM Notification: High Alert  Actions: None    Total time (minutes) spent communicating with patient: 5    Called Pt stated he is doing okay just going for a small walk, currently taking deep breaths

## 2022-05-14 ENCOUNTER — TELEPHONE (OUTPATIENT)
Dept: OTHER | Facility: MEDICAL CENTER | Age: 72
End: 2022-05-14
Payer: COMMERCIAL

## 2022-05-14 VITALS — RESPIRATION RATE: 28 BRPM | OXYGEN SATURATION: 84 % | HEART RATE: 116 BPM

## 2022-05-14 NOTE — TELEPHONE ENCOUNTER
RPM Notification: High Alert  Actions: None    Total time (minutes) spent communicating with patient: 3    Called Pt stated he is okay just talking a longer walk than usual, will relax and catch breath

## 2022-05-15 ENCOUNTER — TELEPHONE (OUTPATIENT)
Dept: OTHER | Facility: MEDICAL CENTER | Age: 72
End: 2022-05-15
Payer: COMMERCIAL

## 2022-05-15 VITALS — OXYGEN SATURATION: 89 % | HEART RATE: 106 BPM | RESPIRATION RATE: 26 BRPM

## 2022-05-15 NOTE — TELEPHONE ENCOUNTER
RPM Notification: High Alert  Actions: None    Total time (minutes) spent communicating with patient: 1    Called Pt stated he is doing okay just walking around

## 2022-05-15 NOTE — TELEPHONE ENCOUNTER
RPM Notification: High Alert  Actions: None    Total time (minutes) spent communicating with patient: 5 min    Alerted at 82% 1144. Juan José stated he was having a coughing fit. I asked how many liters he was on ans he stated 5L. He did go up to 90% while talking to him. I let him know if he alerted again I would call him.

## 2022-05-16 ENCOUNTER — TELEPHONE (OUTPATIENT)
Dept: OTHER | Facility: MEDICAL CENTER | Age: 72
End: 2022-05-16
Payer: COMMERCIAL

## 2022-05-16 VITALS — RESPIRATION RATE: 17 BRPM | HEART RATE: 87 BPM | OXYGEN SATURATION: 95 %

## 2022-05-16 VITALS — OXYGEN SATURATION: 84 % | HEART RATE: 115 BPM | RESPIRATION RATE: 25 BRPM

## 2022-05-16 VITALS — RESPIRATION RATE: 24 BRPM | OXYGEN SATURATION: 91 % | HEART RATE: 109 BPM

## 2022-05-16 NOTE — TELEPHONE ENCOUNTER
RPM Notification: Reconnect and Patient Communication  Actions: None    Total time (minutes) spent communicating with patient: 1    At 1209 Reconnect. Pt was able to get reconnected back to monitor. Pt disconnected earlier to shower and was disconnected for about 35 minutes. Pt stated that also needs to get more supplies for the monitor today. I advised him that I will reach out to [facility] so they can have some supplies ready for him. I asked pt around what time he will be able to get to the ER area to . Pt stated around 1500 and [facility]. I advised him to call us once he arrives.

## 2022-05-16 NOTE — TELEPHONE ENCOUNTER
RPM Notification: High Alert  Actions: None    Called patient, he states he is doing ok. He advises he is feeling very frustrated because he feels his o2 requirement and health are not improving. I reminded him that previously he was on 8L while ambulating and still alerted at that level but now he is on 5L while ambulating. Reminding him of this did seem to reassure him. Patient's o2 did improve before ending the call.    Total time (minutes) spent communicating with patient: 2

## 2022-05-16 NOTE — TELEPHONE ENCOUNTER
RPM Notification: Disconnect  Actions: None    Total time (minutes) spent communicating with patient: 1    At 1133 Disconnect. Pt called to let us know he was going to take off the monitor for awhile to shower and get ready. Pt will reconnect once he is finished.

## 2022-05-18 ENCOUNTER — TELEPHONE (OUTPATIENT)
Dept: OTHER | Facility: MEDICAL CENTER | Age: 72
End: 2022-05-18
Payer: COMMERCIAL

## 2022-05-18 VITALS — OXYGEN SATURATION: 83 % | RESPIRATION RATE: 30 BRPM | HEART RATE: 116 BPM

## 2022-05-18 VITALS — HEART RATE: 90 BPM | OXYGEN SATURATION: 95 % | RESPIRATION RATE: 28 BRPM

## 2022-05-18 NOTE — TELEPHONE ENCOUNTER
RPM Notification: Reconnect  Actions: None    Total time (minutes) spent communicating with patient: 1    Patient reconnected at 1556 w/ SpO2 of 95%.

## 2022-05-18 NOTE — TELEPHONE ENCOUNTER
RPM Notification: Disconnect  Actions: None    Total time (minutes) spent communicating with patient: 1      Patient called to notify us that his battery was blinking red. He was not at home. Patient stated he willl be home in 45 minutes. Patient stated he would change the battery.

## 2022-05-19 ENCOUNTER — TELEPHONE (OUTPATIENT)
Dept: OTHER | Facility: MEDICAL CENTER | Age: 72
End: 2022-05-19
Payer: COMMERCIAL

## 2022-05-19 VITALS — HEART RATE: 92 BPM | OXYGEN SATURATION: 92 % | RESPIRATION RATE: 36 BRPM

## 2022-05-19 NOTE — TELEPHONE ENCOUNTER
RPM Notification: Disconnect and Reconnect  Actions: Device Troubleshoot    Total time (minutes) spent communicating with patient: 9    At 1240 Disconnect. Pt vitals fell off dashboard. Called and spoke with wife Анна. Stated to have pt call us back when they are finished at dr appointment.   At 1309 Pt called us back. He stated that he has finished his appointment and wondering how to get reconnected. I stated that his vitals completely fell off of our dashboard so I have to send him another text invite to get it back on. I sent the message for re-invite. Pt stated he has it but phone was acting up so it was taking a bit of time. Pt was able to get into the link for the invite and follow the steps on his phone. Kept on the line just incase for any more assistance while trying to get pt back up on the dashboard. At 1317 Reconnect. Pt was able to get reconnected and populated back up on the dashboard. Pt stated that his [employer] garfield is up and working and monitor has a solid blue light.

## 2022-05-19 NOTE — TELEPHONE ENCOUNTER
RPM Notification: High Alert  Actions: None    Total time (minutes) spent communicating with patient: 2       Called patient, he states he is doing well. He advised he was walking around to try and exercise his lungs a bit. He did come up to 90% while still on the phone.

## 2022-05-19 NOTE — TELEPHONE ENCOUNTER
RPM Notification: Disconnect and Patient Communication  Actions: None    Total time (minutes) spent communicating with patient: 2    At 1240 Disconnect. Contacted pt and no answer. A minute later pt called back. Pt wife Анна called to let me know that pt is actually in the middle of a dr appointment at the [facility]. I stated to her that I was calling due to pt completely falling off of the dashboard. I advised her that this had happened once before and pt accidentally deleted the garfield. Анна advised me that they have not deleted anything and maybe just a connection thing. I asked how much longer his appointment is suppose to be. She stated to probably about 30 mins. I advised her that when he is finished if one of them can give us a call so we can help troubleshoot to get patient back up to the dashboard. She stated that she would do that.

## 2022-05-20 ENCOUNTER — TELEPHONE (OUTPATIENT)
Dept: OTHER | Facility: MEDICAL CENTER | Age: 72
End: 2022-05-20

## 2022-05-20 VITALS — RESPIRATION RATE: 24 BRPM | OXYGEN SATURATION: 91 % | HEART RATE: 101 BPM

## 2022-05-20 NOTE — TELEPHONE ENCOUNTER
RPM Notification: Graduate  Actions: Discharged Patient from Program    Total time (minutes) spent communicating with patient: 2    At 1230 Graduate. Contacted pt. Pt advised me that he is fine.  I let him know that with his pulmonologist appointment with Dr. Mesa today. She has graduated him from the program. I stated that she advising him to continue with his oxygen and follow-up with his PCP. I stated that the monitor can come off and for any additional supplies can all be thrown away. I advise pt that the [employer] can be uninstalled from his phone as well. I asked pt if he has any additional questions at this time. Pt stated no and thanked us for everything.

## 2023-06-16 ENCOUNTER — OFFICE VISIT (OUTPATIENT)
Dept: URGENT CARE | Facility: CLINIC | Age: 73
End: 2023-06-16
Payer: COMMERCIAL

## 2023-06-16 VITALS
SYSTOLIC BLOOD PRESSURE: 124 MMHG | RESPIRATION RATE: 16 BRPM | HEART RATE: 80 BPM | DIASTOLIC BLOOD PRESSURE: 78 MMHG | BODY MASS INDEX: 29.8 KG/M2 | OXYGEN SATURATION: 94 % | WEIGHT: 220 LBS | HEIGHT: 72 IN | TEMPERATURE: 98.6 F

## 2023-06-16 DIAGNOSIS — J01.01 ACUTE RECURRENT MAXILLARY SINUSITIS: ICD-10-CM

## 2023-06-16 DIAGNOSIS — R30.0 DYSURIA: ICD-10-CM

## 2023-06-16 LAB
APPEARANCE UR: CLEAR
BILIRUB UR STRIP-MCNC: NEGATIVE MG/DL
COLOR UR AUTO: YELLOW
GLUCOSE UR STRIP.AUTO-MCNC: 500 MG/DL
KETONES UR STRIP.AUTO-MCNC: NEGATIVE MG/DL
LEUKOCYTE ESTERASE UR QL STRIP.AUTO: NEGATIVE
NITRITE UR QL STRIP.AUTO: NEGATIVE
PH UR STRIP.AUTO: 5.5 [PH] (ref 5–8)
PROT UR QL STRIP: NEGATIVE MG/DL
RBC UR QL AUTO: NEGATIVE
SP GR UR STRIP.AUTO: 1.01
UROBILINOGEN UR STRIP-MCNC: 0.2 MG/DL

## 2023-06-16 PROCEDURE — 3078F DIAST BP <80 MM HG: CPT | Performed by: NURSE PRACTITIONER

## 2023-06-16 PROCEDURE — 99203 OFFICE O/P NEW LOW 30 MIN: CPT | Performed by: NURSE PRACTITIONER

## 2023-06-16 PROCEDURE — 81002 URINALYSIS NONAUTO W/O SCOPE: CPT | Performed by: NURSE PRACTITIONER

## 2023-06-16 PROCEDURE — 3074F SYST BP LT 130 MM HG: CPT | Performed by: NURSE PRACTITIONER

## 2023-06-16 RX ORDER — DOXYCYCLINE HYCLATE 100 MG
100 TABLET ORAL 2 TIMES DAILY
Qty: 14 TABLET | Refills: 0 | Status: SHIPPED | OUTPATIENT
Start: 2023-06-16 | End: 2023-06-23

## 2023-06-16 ASSESSMENT — ENCOUNTER SYMPTOMS
SINUS PRESSURE: 1
COUGH: 1

## 2023-06-16 ASSESSMENT — FIBROSIS 4 INDEX: FIB4 SCORE: 1.01

## 2023-06-16 NOTE — PROGRESS NOTES
"  Subjective:     Juan José Pate is a 73 y.o. male who presents for UTI (Pain and burning  while using restroom, cramps,  x 1 day  No N/V )      Reports dysuria, at the tip. No redness, discharge, or itch. No new medications. States he is \"working on it\", regarding glucose control. Cramping to lower abdomen. Normal BM. No current pain. No testicular pain or swelling. Not currently sexually active. Hx of renal calculi.     Sinus infection 2 week ago, was placed on 2 antibiotics. Concerned for return of sinusitus. Hx of increased cough.    UTI  This is a new problem. The current episode started today. Associated symptoms include congestion, coughing and urinary symptoms. Pertinent negatives include no fever.   Sinusitis  This is a recurrent problem. Associated symptoms include congestion, coughing and sinus pressure.       Past Medical History:   Diagnosis Date    Asthma     Chronic obstructive pulmonary disease (HCC)     COVID     Diabetes (HCC)     Hypertension        No past surgical history on file.    Social History     Socioeconomic History    Marital status:      Spouse name: Not on file    Number of children: Not on file    Years of education: Not on file    Highest education level: Not on file   Occupational History    Not on file   Tobacco Use    Smoking status: Former     Years: 6.00     Types: Cigarettes     Quit date:      Years since quittin.4    Smokeless tobacco: Never   Vaping Use    Vaping Use: Never used   Substance and Sexual Activity    Alcohol use: Never    Drug use: Never    Sexual activity: Not on file   Other Topics Concern    Not on file   Social History Narrative    Not on file     Social Determinants of Health     Financial Resource Strain: Not on file   Food Insecurity: Not on file   Transportation Needs: Not on file   Physical Activity: Not on file   Stress: Not on file   Social Connections: Not on file   Intimate Partner Violence: Not on file   Housing Stability: " Not on file        Family History   Problem Relation Age of Onset    Hypertension Father         Allergies   Allergen Reactions    Other Food Shortness of Breath     Per patient's wife, patient is allergic to teriyaki sauce, no specific ingredients identified as allergen per wife, Анна. Reports reaction is wheezing, respiratory distress.    Oxycodone Vomiting and Nausea    Sulfa Drugs Rash     .       Review of Systems   Constitutional:  Negative for fever.   HENT:  Positive for congestion and sinus pressure.    Respiratory:  Positive for cough.    Genitourinary:  Positive for dysuria. Negative for flank pain.   All other systems reviewed and are negative.       Objective:   /78 (BP Location: Right arm, Patient Position: Sitting, BP Cuff Size: Adult)   Pulse 80   Temp 37 °C (98.6 °F) (Temporal)   Resp 16   Ht 1.829 m (6')   Wt 99.8 kg (220 lb)   SpO2 94% Comment: 3 liters of oxygen  BMI 29.84 kg/m²     Physical Exam  Vitals reviewed.   Constitutional:       General: He is not in acute distress.     Appearance: He is well-developed.   HENT:      Head: Normocephalic and atraumatic.      Comments: Clear secretions.      Right Ear: External ear normal. Tympanic membrane is not erythematous.      Left Ear: External ear normal. Tympanic membrane is not erythematous.      Nose: Congestion present.      Mouth/Throat:      Mouth: Mucous membranes are moist.   Eyes:      Conjunctiva/sclera: Conjunctivae normal.   Cardiovascular:      Rate and Rhythm: Normal rate.   Pulmonary:      Effort: Pulmonary effort is normal. No respiratory distress.      Breath sounds: Normal breath sounds.   Abdominal:      Palpations: Abdomen is soft.      Tenderness: There is no abdominal tenderness.      Hernia: A hernia is present. Hernia is present in the umbilical area.   Genitourinary:     Penis: Normal and uncircumcised. No erythema, tenderness, discharge, swelling or lesions.    Musculoskeletal:         General: Normal range of  motion.      Cervical back: Neck supple.   Skin:     General: Skin is warm and dry.      Findings: No rash.   Neurological:      Mental Status: He is alert and oriented to person, place, and time.      GCS: GCS eye subscore is 4. GCS verbal subscore is 5. GCS motor subscore is 6.   Psychiatric:         Speech: Speech normal.         Behavior: Behavior normal.         Thought Content: Thought content normal.         Judgment: Judgment normal.         Assessment/Plan:   1. Dysuria  - POCT Urinalysis    2. Acute recurrent maxillary sinusitis  - doxycycline (VIBRAMYCIN) 100 MG Tab; Take 1 Tablet by mouth 2 times a day for 7 days.  Dispense: 14 Tablet; Refill: 0    Results for orders placed or performed in visit on 06/16/23   POCT Urinalysis   Result Value Ref Range    POC Color Yellow Negative    POC Appearance Clear Negative    POC Glucose 500 Negative mg/dL    POC Bilirubin Negative Negative mg/dL    POC Ketones Negative Negative mg/dL    POC Specific Gravity 1.015 <1.005 - >1.030    POC Blood Negative Negative    POC Urine PH 5.5 5.0 - 8.0    POC Protein Negative Negative mg/dL    POC Urobiligen 0.2 Negative (0.2) mg/dL    POC Nitrites Negative Negative    POC Leukocyte Esterase Negative Negative     -Oral Hydration: Drink plenty of water.  -Follow up with PCP.    Follow up urgently for new or persistent abdominal pain, flank pain, difficulty with urination, fevers, vomiting, weakness, tachycardia, testicular pain or swelling, or any other concerns.    Sinusitis:  -Nasal spray and allergy medications as directed.  -You may try saline irrigation or neti pot.   -Warm compress to sinuses.      Follow up with primary care provider. Urgently for worsening symptoms, persistent fevers, facial swelling, visual changes, weakness, elevated heart rate, stiff neck, shortness of breath,  or any other concerns.    -Stable vitals. Afebrile. No CVA or abdominal tenderness to palpation. Negative UA.    Differential diagnosis, natural  history, supportive care, and indications for immediate follow-up discussed.

## 2023-06-16 NOTE — PATIENT INSTRUCTIONS
-Oral Hydration: Drink plenty of water.  -Follow up with PCP.    Follow up urgently for new or persistent abdominal pain, flank pain, difficulty with urination, fevers, vomiting, weakness, tachycardia, testicular pain or swelling, or any other concerns.    Sinusitis:  -Nasal spray and allergy medications as directed.  -You may try saline irrigation or neti pot.   -Warm compress to sinuses.      Follow up with primary care provider. Urgently for worsening symptoms, persistent fevers, facial swelling, visual changes, weakness, elevated heart rate, stiff neck, shortness of breath,  or any other concerns.

## 2023-06-20 PROBLEM — Z99.81 SUPPLEMENTAL OXYGEN DEPENDENT: Status: ACTIVE | Noted: 2019-06-19

## 2023-06-20 PROBLEM — F33.42 MAJOR DEPRESSIVE DISORDER, RECURRENT EPISODE, IN FULL REMISSION (HCC): Status: ACTIVE | Noted: 2020-09-14

## 2023-06-20 PROBLEM — K21.9 GERD (GASTROESOPHAGEAL REFLUX DISEASE): Status: ACTIVE | Noted: 2019-11-11

## 2023-06-20 PROBLEM — R25.1 TREMOR: Status: ACTIVE | Noted: 2019-02-22

## 2023-06-20 PROBLEM — E11.69 HYPERLIPIDEMIA DUE TO TYPE 2 DIABETES MELLITUS (HCC): Status: ACTIVE | Noted: 2019-02-22

## 2023-06-20 PROBLEM — G31.1 SENILE DEGENERATION OF BRAIN (HCC): Status: ACTIVE | Noted: 2021-10-29

## 2023-06-20 PROBLEM — E78.5 HYPERLIPIDEMIA DUE TO TYPE 2 DIABETES MELLITUS (HCC): Status: ACTIVE | Noted: 2019-02-22

## 2023-06-20 ASSESSMENT — ENCOUNTER SYMPTOMS
FEVER: 0
FLANK PAIN: 0

## 2024-03-29 ENCOUNTER — OFFICE VISIT (OUTPATIENT)
Dept: URGENT CARE | Facility: CLINIC | Age: 74
End: 2024-03-29
Payer: COMMERCIAL

## 2024-03-29 VITALS
DIASTOLIC BLOOD PRESSURE: 76 MMHG | OXYGEN SATURATION: 92 % | BODY MASS INDEX: 28.02 KG/M2 | HEIGHT: 72 IN | SYSTOLIC BLOOD PRESSURE: 120 MMHG | HEART RATE: 105 BPM | TEMPERATURE: 97.7 F | RESPIRATION RATE: 16 BRPM | WEIGHT: 206.9 LBS

## 2024-03-29 DIAGNOSIS — N18.31 STAGE 3A CHRONIC KIDNEY DISEASE: ICD-10-CM

## 2024-03-29 DIAGNOSIS — B02.9 HERPES ZOSTER WITHOUT COMPLICATION: ICD-10-CM

## 2024-03-29 RX ORDER — VALACYCLOVIR HYDROCHLORIDE 1 G/1
1000 TABLET, FILM COATED ORAL 3 TIMES DAILY
Qty: 21 TABLET | Refills: 0 | Status: SHIPPED | OUTPATIENT
Start: 2024-03-29 | End: 2024-04-05

## 2024-03-29 RX ORDER — VALACYCLOVIR HYDROCHLORIDE 1 G/1
1000 TABLET, FILM COATED ORAL 3 TIMES DAILY
Qty: 21 TABLET | Refills: 0 | Status: SHIPPED | OUTPATIENT
Start: 2024-03-29 | End: 2024-03-30

## 2024-03-29 ASSESSMENT — ENCOUNTER SYMPTOMS: FEVER: 0

## 2024-03-29 ASSESSMENT — FIBROSIS 4 INDEX: FIB4 SCORE: 1.03

## 2024-03-30 NOTE — PATIENT INSTRUCTIONS
-Over the counter NSAIDs or Tylenol for pain.  -Can apply OTC Capsaicin directly to skin for pain, or use lidocaine gel/patch on intact skin.  -Calamine lotion  -Cool bath or compress for pain or itching.  -Keep the rash covered, and wash hands often to prevent the spread of virus to others.  -Monitor for signs of infection: Redness, pus, increased swelling or fever.    -Follow up with Primary Care Provider, complications can include persistent pain (postherpatic neuralgia). Follow up urgently for signs of infection, rash to face or eye, vision changes, eye pain, ear pain, facial paralysis, dizziness, neck stiffness and pain.

## 2024-03-30 NOTE — PROGRESS NOTES
Subjective:     Juan José Pate is a 74 y.o. male who presents for Rash (Rash on left torso, tenderness x1 day)      Presents for an acute rash to his left side. Pain 4/10 to touch. No history of shingles. Has had the vaccine.     Rash  This is a new problem. The current episode started yesterday. The rash is characterized by redness and blistering. He was exposed to nothing. Pertinent negatives include no fever. Past treatments include nothing. His past medical history is significant for varicella.       Past Medical History:   Diagnosis Date    Asthma     Chronic obstructive pulmonary disease (HCC)     COVID     Diabetes (HCC)     Hypertension        History reviewed. No pertinent surgical history.    Social History     Socioeconomic History    Marital status:      Spouse name: Not on file    Number of children: Not on file    Years of education: Not on file    Highest education level: Not on file   Occupational History    Not on file   Tobacco Use    Smoking status: Former     Current packs/day: 0.00     Types: Cigarettes     Start date:      Quit date:      Years since quittin.2    Smokeless tobacco: Never   Vaping Use    Vaping Use: Never used   Substance and Sexual Activity    Alcohol use: Never    Drug use: Never    Sexual activity: Not on file   Other Topics Concern    Not on file   Social History Narrative    Not on file     Social Determinants of Health     Financial Resource Strain: Not on file   Food Insecurity: Not on file   Transportation Needs: Not on file   Physical Activity: Not on file   Stress: Not on file   Social Connections: Not on file   Intimate Partner Violence: Not on file   Housing Stability: Not on file        Family History   Problem Relation Age of Onset    Hypertension Father         Allergies   Allergen Reactions    Other Food Shortness of Breath     Per patient's wife, patient is allergic to teriyaki sauce, no specific ingredients identified as allergen per  wife, Анна. Reports reaction is wheezing, respiratory distress.    Oxycodone Vomiting and Nausea    Sulfa Drugs Rash     .       Review of Systems   Constitutional:  Negative for fever.   Skin:  Positive for rash.   All other systems reviewed and are negative.       Objective:   /76   Pulse (!) 105   Temp 36.5 °C (97.7 °F) (Temporal)   Resp 16   Ht 1.829 m (6')   Wt 93.8 kg (206 lb 14.4 oz)   SpO2 92%   BMI 28.06 kg/m²     Physical Exam  Vitals reviewed.   Constitutional:       General: He is not in acute distress.     Appearance: He is not toxic-appearing.   Pulmonary:      Effort: Pulmonary effort is normal. No respiratory distress.   Skin:     General: Skin is warm and dry.      Findings: Rash present. Rash is vesicular.             Comments: Scattered vesicular lesions with erythemic base to left trunk. Does not pass midline.   Neurological:      Mental Status: He is alert and oriented to person, place, and time.         Assessment/Plan:   1. Herpes zoster without complication  - valacyclovir (VALTREX) 1 GM Tab; Take 1 Tablet by mouth 3 times a day for 7 days.  Dispense: 21 Tablet; Refill: 0    2. Stage 3a chronic kidney disease (HCC)  -Over the counter Tylenol for pain.  -Can apply OTC Capsaicin directly to skin for pain, or use lidocaine gel/patch on intact skin.  -Calamine lotion  -Cool bath or compress for pain or itching.  -Keep the rash covered, and wash hands often to prevent the spread of virus to others.  -Monitor for signs of infection: Redness, pus, increased swelling or fever.    -Follow up with Primary Care Provider, complications can include persistent pain (postherpatic neuralgia). Follow up urgently for signs of infection, rash to face or eye, vision changes, eye pain, ear pain, facial paralysis, dizziness, neck stiffness and pain.    -Discussed risks to those who are pregnant and unvaccinated for varicella. Stable vitals. No secondary signs of cellulitis. Reviewed recent labs from  3/18. Creatinine 1.57, GFR 46. Calculated creatinine clearance is 54, no adjustment to the medication is necessary.    Differential diagnosis, natural history, supportive care, and indications for immediate follow-up discussed.

## 2025-06-17 ENCOUNTER — OFFICE VISIT (OUTPATIENT)
Dept: URGENT CARE | Facility: CLINIC | Age: 75
End: 2025-06-17
Payer: COMMERCIAL

## 2025-06-17 VITALS
HEART RATE: 86 BPM | DIASTOLIC BLOOD PRESSURE: 60 MMHG | RESPIRATION RATE: 14 BRPM | SYSTOLIC BLOOD PRESSURE: 118 MMHG | HEIGHT: 72 IN | TEMPERATURE: 97.9 F | BODY MASS INDEX: 26.28 KG/M2 | WEIGHT: 194 LBS | OXYGEN SATURATION: 94 %

## 2025-06-17 DIAGNOSIS — M65.4 TENDINITIS, DE QUERVAIN'S: Primary | ICD-10-CM

## 2025-06-17 PROCEDURE — 99213 OFFICE O/P EST LOW 20 MIN: CPT | Performed by: NURSE PRACTITIONER

## 2025-06-17 PROCEDURE — 3074F SYST BP LT 130 MM HG: CPT | Performed by: NURSE PRACTITIONER

## 2025-06-17 PROCEDURE — 3078F DIAST BP <80 MM HG: CPT | Performed by: NURSE PRACTITIONER

## 2025-06-18 NOTE — PROGRESS NOTES
"  Subjective:     Juan José Pate is a 75 y.o. male who presents for Wrist Pain (Right wrist pain x 2 days)      Presents for right radial wrist pain since yesterday. Denies an injury. Has \"a little\" numbness in his 3rd-4th digits.            Wrist Injury   There was no injury mechanism. The pain is at a severity of 8/10. Associated symptoms include numbness. He has tried NSAIDs for the symptoms.       Past Medical History[1]    Past Surgical History[2]    Social History     Socioeconomic History    Marital status:      Spouse name: Not on file    Number of children: Not on file    Years of education: Not on file    Highest education level: Not on file   Occupational History    Not on file   Tobacco Use    Smoking status: Former     Current packs/day: 0.00     Types: Cigarettes     Start date:      Quit date:      Years since quittin.4    Smokeless tobacco: Never   Vaping Use    Vaping status: Never Used   Substance and Sexual Activity    Alcohol use: Never    Drug use: Never    Sexual activity: Not on file   Other Topics Concern    Not on file   Social History Narrative    Not on file     Social Drivers of Health     Financial Resource Strain: Not on file   Food Insecurity: Not on file   Transportation Needs: Not on file   Physical Activity: Not on file   Stress: Not on file   Social Connections: Not on file   Intimate Partner Violence: Not on file   Housing Stability: Not on file        Family History   Problem Relation Age of Onset    Hypertension Father         Allergies[3]    Review of Systems   Musculoskeletal:  Positive for joint pain.   Neurological:  Positive for sensory change and numbness.   All other systems reviewed and are negative.       Objective:   /60   Pulse 86   Temp 36.6 °C (97.9 °F) (Temporal)   Resp 14   Ht 1.829 m (6')   Wt 88 kg (194 lb)   SpO2 94% Comment: 3 liters of oxygen  BMI 26.31 kg/m²     Physical Exam  Vitals reviewed.   Constitutional:       " General: He is not in acute distress.     Appearance: He is not toxic-appearing.   Musculoskeletal:         General: Tenderness present.      Right wrist: Tenderness present. Normal pulse.      Right hand: Normal range of motion. Normal capillary refill.      Comments: Right radial wrist tenderness to palpation. Positive finkelstein's.   Skin:     General: Skin is warm and dry.      Capillary Refill: Capillary refill takes less than 2 seconds.      Findings: No bruising or erythema.   Neurological:      Mental Status: He is alert and oriented to person, place, and time.         Assessment/Plan:   1. Tendinitis, de Quervain's  - Referral to Orthopedics    -Thumb spica.  -Wrist splint for 1-2 weeks  -Voltaren gel  -Can also take tylenol as directed for pain.   -RICE Therapy: Rest, Ice, Compression, Elevation.   -Ice 15 to 20 minutes every two to three hours for the first 48 hours or until swelling is improved.  -Gentle range of motion exercises; flexion and extension of the wrist as pain allows.  -Follow up for persistent symptoms.     Follow up emergently for severe uncontrolled pain, neurovascular compromise (persistent decreased sensation, motion, or circulation).    -Presents with acute wrist pain, without an injury. Positive finkelstein on exam. Discussed initial RICE therapy and splinting; with orthopedic follow up for persistent symptoms.    Differential diagnosis, natural history, supportive care, and indications for immediate follow-up discussed.         [1]   Past Medical History:  Diagnosis Date    Asthma     Chronic obstructive pulmonary disease (HCC)     COVID     Diabetes (HCC)     Hypertension    [2] No past surgical history on file.  [3]   Allergies  Allergen Reactions    Other Food Shortness of Breath     Per patient's wife, patient is allergic to teriyaki sauce, no specific ingredients identified as allergen per wife, Анна. Reports reaction is wheezing, respiratory distress.    Hydrocodone Vomiting     Metformin Diarrhea    Oxycodone Vomiting and Nausea    Sulfa Drugs Rash     .

## 2025-06-18 NOTE — PATIENT INSTRUCTIONS
-Wrist splint for 1-2 weeks  -Voltaren gel  -Can also take tylenol as directed for pain.   -RICE Therapy: Rest, Ice, Compression, Elevation.   -Ice 15 to 20 minutes every two to three hours for the first 48 hours or until swelling is improved.  -Gentle range of motion exercises; flexion and extension of the wrist as pain allows.  -Follow up for persistent symptoms.     Follow up emergently for severe uncontrolled pain, neurovascular compromise (persistent decreased sensation, motion, or circulation).

## 2025-06-19 ASSESSMENT — ENCOUNTER SYMPTOMS
SENSORY CHANGE: 1
NUMBNESS: 1